# Patient Record
Sex: FEMALE | Race: WHITE | NOT HISPANIC OR LATINO | Employment: STUDENT | URBAN - METROPOLITAN AREA
[De-identification: names, ages, dates, MRNs, and addresses within clinical notes are randomized per-mention and may not be internally consistent; named-entity substitution may affect disease eponyms.]

---

## 2020-06-20 ENCOUNTER — OFFICE VISIT (OUTPATIENT)
Dept: URGENT CARE | Facility: PHYSICIAN GROUP | Age: 25
End: 2020-06-20
Payer: COMMERCIAL

## 2020-06-20 VITALS
HEIGHT: 62 IN | HEART RATE: 86 BPM | OXYGEN SATURATION: 97 % | TEMPERATURE: 98.6 F | BODY MASS INDEX: 21.16 KG/M2 | RESPIRATION RATE: 16 BRPM | DIASTOLIC BLOOD PRESSURE: 82 MMHG | WEIGHT: 115 LBS | SYSTOLIC BLOOD PRESSURE: 138 MMHG

## 2020-06-20 DIAGNOSIS — L42 PITYRIASIS ROSEA: ICD-10-CM

## 2020-06-20 PROCEDURE — 99202 OFFICE O/P NEW SF 15 MIN: CPT | Performed by: EMERGENCY MEDICINE

## 2020-06-20 RX ORDER — ACYCLOVIR 800 MG/1
800 TABLET ORAL
Qty: 35 TAB | Refills: 0 | Status: SHIPPED | OUTPATIENT
Start: 2020-06-20 | End: 2020-06-27

## 2020-06-20 ASSESSMENT — ENCOUNTER SYMPTOMS: FEVER: 0

## 2020-06-20 NOTE — PROGRESS NOTES
"Subjective:      Evonne Schreiber is a 24 y.o. female who presents with Rash (all over torso x 1 week)            Rash   This is a new problem. The current episode started in the past 7 days. The problem has been gradually worsening since onset. The affected locations include the torso, left axilla, left arm, right axilla, right arm, left upper leg and right upper leg. The rash is characterized by redness and itchiness. She was exposed to nothing. Pertinent negatives include no fever or joint pain. Treatments tried: antifungal. The treatment provided no relief. There is no history of eczema.       Review of Systems   Constitutional: Negative for fever.   Musculoskeletal: Negative for joint pain.   Skin: Positive for itching and rash.   Endo/Heme/Allergies: Negative for environmental allergies.       History reviewed. No pertinent past medical history.  History reviewed. No pertinent surgical history.   Allergy:  Patient has no known allergies.     Current Outpatient Medications:   •  acyclovir, 800 mg, Oral, 5X/DAY   family history is not on file.   Social History     Tobacco Use   • Smoking status: Current Every Day Smoker   • Smokeless tobacco: Never Used   Substance Use Topics   • Alcohol use: Not on file   • Drug use: Not on file       Objective:     /82 (BP Location: Right arm, Patient Position: Sitting, BP Cuff Size: Adult)   Pulse 86   Temp 37 °C (98.6 °F) (Temporal)   Resp 16   Ht 1.575 m (5' 2\")   Wt 52.2 kg (115 lb)   SpO2 97%   BMI 21.03 kg/m²      Physical Exam  Constitutional:       General: She is not in acute distress.     Appearance: Normal appearance. She is well-developed.   Eyes:      General: Lids are normal.      Conjunctiva/sclera: Conjunctivae normal.   Pulmonary:      Effort: Pulmonary effort is normal.   Skin:     Findings: Rash present. No petechiae or wound.          Neurological:      Mental Status: She is alert and oriented to person, place, and time.   Psychiatric:      " Behavior: Behavior is cooperative.                 Assessment/Plan:       1. Pityriasis rosea  OTC antihistamine as needed itching.  Rx acyclovir

## 2020-06-20 NOTE — PATIENT INSTRUCTIONS
Use over-the-counter levocetirizine (Xyxal), cetirizine (Zyrtec), fexofenadine (Allegra), or loratadine (Claritin) as needed for relief of symptoms.  Pityriasis Rosea  Introduction  Pityriasis rosea is a rash that usually appears on the trunk of the body. It may also appear on the upper arms and upper legs. It usually begins as a single patch, and then more patches begin to develop. The rash may cause mild itching, but it normally does not cause other problems. It usually goes away without treatment. However, it may take weeks or months for the rash to go away completely.  What are the causes?  The cause of this condition is not known. The condition does not spread from person to person (is noncontagious).  What increases the risk?  This condition is more likely to develop in young adults and children. It is most common in the spring and fall.  What are the signs or symptoms?  The main symptom of this condition is a rash.  · The rash usually begins with a single oval patch that is larger than the ones that follow. This is called a herald patch. It generally appears a week or more before the rest of the rash appears.  · When more patches start to develop, they spread quickly on the trunk, back, and arms. These patches are smaller than the first one.  · The patches that make up the rash are usually oval-shaped and pink or red in color. They are usually flat, but they may sometimes be raised so that they can be felt with a finger. They may also be finely crinkled and have a scaly ring around the edge.  · The rash does not typically appear on areas of the skin that are exposed to the sun.  Most people who have this condition do not have other symptoms, but some have mild itching. In a few cases, a mild headache or body aches may occur before the rash appears and then go away.  How is this diagnosed?  Your health care provider may diagnose this condition by doing a physical exam and taking your medical history. To rule  out other possible causes for the rash, the health care provider may order blood tests or take a skin sample from the rash to be looked at under a microscope.  How is this treated?  Usually, treatment is not needed for this condition. The rash will probably go away on its own in 4-8 weeks. In some cases, a health care provider may recommend or prescribe medicine to reduce itching.  Follow these instructions at home:  · Take medicines only as directed by your health care provider.  · Avoid scratching the affected areas of skin.  · Do not take hot baths or use a sauna. Use only warm water when bathing or showering. Heat can increase itching.  Contact a health care provider if:  · Your rash does not go away in 8 weeks.  · Your rash gets much worse.  · You have a fever.  · You have swelling or pain in the rash area.  · You have fluid, blood, or pus coming from the rash area.  This information is not intended to replace advice given to you by your health care provider. Make sure you discuss any questions you have with your health care provider.  Document Released: 01/24/2003 Document Revised: 05/25/2017 Document Reviewed: 11/25/2015  © 2017 [employer]

## 2021-07-14 ENCOUNTER — HOSPITAL ENCOUNTER (OUTPATIENT)
Facility: MEDICAL CENTER | Age: 26
End: 2021-07-14
Attending: OBSTETRICS & GYNECOLOGY | Admitting: OBSTETRICS & GYNECOLOGY
Payer: COMMERCIAL

## 2021-07-14 ENCOUNTER — APPOINTMENT (OUTPATIENT)
Dept: RADIOLOGY | Facility: MEDICAL CENTER | Age: 26
End: 2021-07-14
Attending: OBSTETRICS & GYNECOLOGY
Payer: COMMERCIAL

## 2021-07-14 VITALS — HEART RATE: 85 BPM | DIASTOLIC BLOOD PRESSURE: 85 MMHG | OXYGEN SATURATION: 97 % | SYSTOLIC BLOOD PRESSURE: 120 MMHG

## 2021-07-14 PROCEDURE — 93971 EXTREMITY STUDY: CPT | Mod: 26,RT | Performed by: INTERNAL MEDICINE

## 2021-07-14 PROCEDURE — 93971 EXTREMITY STUDY: CPT | Mod: RT

## 2021-07-14 NOTE — ED PROVIDER NOTES
OB ED Note    CC: Right chest tenderness, RLE pain    HPI: Evonne Box is a 26 yo  who is PPD#7 after IOL for preeclampsia with severe features. She had a prolonged induction that eventually culminated in vaginal delivery. She was on mag for her whole induction and 24 hours post partum. She reports persistent pain her RLE since her admission for delivery. It is worse with flexion of her foot and palpation of her calf. She denies swelling. It was never imaged at [facility]. She also reports sharp pain on her right breast/chest wall that is worse with inspiration. Denies SOB. She did wake up this morning and had swelling of her right eyelid and hand but this has since resolved.     ROS: all other systems were reviewed and found to be negative    /85   Pulse 85   SpO2 97%     General: NAD, sitting up comfortably, speaking full sentences\  Chest: TTP reproduced over her right medial breast at 2-o-clock, palpable clogged duct, milky discharge seen from her nipples  Abdomen: soft, NT, non distended  Extremities: symmetric in size, no visible erythema, TTP of her right calf    Doppler US: negative for DVT    Assessment  RLE pain  Clogged duct    Plan: No DVT, symptomatic management of her discomfort reviewed. Discussed management of a clogged duct - heat packs, massage and pumped recommended. Pt will call if no improvement in 48 hours.    Alek Kenney M.D.

## 2021-07-14 NOTE — DISCHARGE INSTRUCTIONS
PATIENT DISCHARGE EDUCATION INSTRUCTION SHEET  REASONS TO CALL YOUR OBSTETRICIAN  · Persistent fever, shaking, chills (Temperature higher than 100.4) may indicate you have an infection  · Heavy bleeding: soaking more than 1 pad per hour; Passing clots an egg-sized clot or bigger may mean you have an postpartum hemorrhage  · Foul odor from vagina or bad smelling or discolored discharge or blood  · Breast infection (Mastitis symptoms); breast pain, chills, fever, redness or red streaks, may feel flu like symptoms  · Urinary pain, burning or frequency  · Incision that is not healing, increased redness, swelling, tenderness or pain, or any pus from episiotomy or  site may mean you have an infection  · Redness, swelling, warmth, or painful to touch in the calf area of your leg may mean you have a blood clot  · Severe or intensified depression, thoughts or feelings of wanting to hurt yourself or someone else   · Pain in chest, obstructed breathing or shortness of breath (trouble catching your breath) may mean you are having a postpartum complication. Call your provider immediately   · Headache that does not get better, even after taking medicine, a bad headache with vision changes or pain in the upper right area of your belly may mean you have high blood pressure or post birth preeclampsia. Call your provider immediately    HAND WASHING  All family and friends should wash their hands:  · Before and after holding the baby  · Before feeding the baby  · After using the restroom or changing the baby's diaper    WOUND CARE  Ask your physician for additional care instructions. In general:  ·  Incision:  · May shower and pat incision dry   · Keep the incision clean and dry  · There should not be any opening or pus from the incision  · Continue to walk at home 3 times a day   · Do NOT lift anything heavier than your baby (over 10 pounds)  · Encourage family to help participate in care of the  to allow  rest and mom time to heal  · Episiotomy/Laceration  · May use lora-spray bottle, witch hazel pads and dermaplast spray for comfort  · Use lora-spray bottle after urinating to cleanse perineal area  · To prevent burning during urination spray lora-water bottle on labial area   · Pat perineal area dry until episiotomy/laceration is healed  · Continue to use lora-bottle until bleeding stops as needed  · If have a 2nd degree laceration or greater, a Sitz bath can offer relief from soreness, burning, and inflammation   · Sitz Bath   · Sit in 6 inches of warm water and soak laceration as needed until the laceration heals    VAGINAL CARE AND BLEEDING  · Nothing inside vagina for 6 weeks:   · No sexual intercourse, tampons or douching  · Bleeding may continue for 2-4 weeks. Amount and color may vary  · Soaking 1 pad or more in an hour for several hours is considered heavy bleeding  · Passing large egg sized blood clots can be concerning  · If you feel like you have heavy bleeding or are having increasing amount of blood clots call your Obstetrician immediately  · If you begin feeling faint upon standing, feeling sick to your stomach, have clammy skin, a really fast heartbeat, have chills, start feeling confused, dizzy, sleepy or weak, or feeling like you're going to faint call your Obstetrician immediately    HYPERTENSION   Preeclampsia or gestational hypertension are types of high blood pressure that only pregnant women can get. It is important for you to be aware of symptoms to seek early intervention and treatment. If you have any of these symptoms immediately call your Obstetrician    · Vision changes or blurred vision   · Severe headache or pain that is unrelieved with medication and will not go away  · Persistent pain in upper abdomen or shoulder   · Increased swelling of face, feet, or hands  · Difficulty breathing or shortness of breath at rest  · Urinating less than usual    URINATION AND BOWEL MOVEMENTS  · Eating  "more fiber (bran cereal, fruits, and vegetables) and drinking plenty of fluids will help to avoid constipation  · Urinary frequency and urgency after childbirth is normal  · If you experience any urinary pain, burning or frequency call your provider    BIRTH CONTROL  · It is possible to become pregnant at any time after delivery and while breastfeeding  · Plan to discuss a method of birth control with your physician at your post delivery follow up visit    POSTPARTUM BLUES  During the first few days after birth, you may experience a sense of the \"blues\" which may include impatience, irritability or even crying. These feelings come and go quickly. However, as many as 1 in 10 women experience emotional symptoms known as postpartum depression.     POSTPARTUM DEPRESSION    May start as early as the second or third day after delivery or take several weeks or months to develop. Symptoms of \"blues\" are present, but are more intense: Crying spells; loss of appetite; feelings of hopelessness or loss of control; fear of touching the baby; over concern or no concern at all about the baby; little or no concern about your own appearance/caring for yourself; and/or inability to sleep or excessive sleeping. Contact your Obstetrician if you are experiencing any of these symptoms     PREVENTING SHAKEN BABY  If you are angry or stressed, PUT THE BABY IN THE CRIB, step away, take some deep breaths, and wait until you are calm to care for the baby. DO NOT SHAKE THE BABY. You are not alone, call a supporter for help.  · [facility] 24/7 crisis call line (397-734-6620) or (1-300.968.1210)  · You can also text them, text \"ANSWER\" (092157)        "

## 2021-07-14 NOTE — PROGRESS NOTES
26 y/o , pt delivered on 21 at [facility]. Pt was an IOL for Pre -E, pt states she was on magnesium for two days before delivering. Pt came in today with complaints of right calf pain for the last 3-4 days. No redness or swelling noted. Pt rates pain of 4/10 with increased pain when touched, states constant pain. Last night pt also states she had swelling to her right eye and hand that went away after she placed warm packs on the swelling. None noted now. Pt also states last night she noticed pain in her right breast. Pt initial BP was 144/101. Call placed to Dr. Pablito MD updated on pt.   1415 - Dr. Kenney to bedside. Pt assessed by MD. Orders received for an US of the right calf to rule out DVT. Per MD pt likely has a clogged duct on the right breast. BP now 118/76.   1445 - Heat pack and pump kit provided. Pt began pumping.   1500 - US at bedside. US completed, awaiting results.   1545 - Results of US back. Results reviewed with Dr. Kenney. MD in to see pt. Orders to discharge pt. Pt feels safe to discharge. Pt understands when to return or call with any concerns.

## 2022-12-07 ENCOUNTER — GYNECOLOGY VISIT (OUTPATIENT)
Dept: OBGYN | Facility: CLINIC | Age: 27
End: 2022-12-07
Payer: COMMERCIAL

## 2022-12-07 VITALS
DIASTOLIC BLOOD PRESSURE: 62 MMHG | SYSTOLIC BLOOD PRESSURE: 122 MMHG | WEIGHT: 174 LBS | BODY MASS INDEX: 32.02 KG/M2 | HEIGHT: 62 IN

## 2022-12-07 DIAGNOSIS — O09.292 HX OF PREECLAMPSIA, PRIOR PREGNANCY, CURRENTLY PREGNANT, SECOND TRIMESTER: ICD-10-CM

## 2022-12-07 DIAGNOSIS — N93.8 DUB (DYSFUNCTIONAL UTERINE BLEEDING): ICD-10-CM

## 2022-12-07 PROCEDURE — 99203 OFFICE O/P NEW LOW 30 MIN: CPT | Mod: 25 | Performed by: PHYSICIAN ASSISTANT

## 2022-12-07 PROCEDURE — 76830 TRANSVAGINAL US NON-OB: CPT | Performed by: PHYSICIAN ASSISTANT

## 2022-12-07 RX ORDER — CHLORAL HYDRATE 500 MG
1000 CAPSULE ORAL
Status: ON HOLD | COMMUNITY
End: 2023-05-08

## 2022-12-07 RX ORDER — SODIUM PHOSPHATE,MONO-DIBASIC 19G-7G/118
500 ENEMA (ML) RECTAL
Status: ON HOLD | COMMUNITY
End: 2023-05-08

## 2022-12-07 NOTE — PROGRESS NOTES
"Subjective     Evonne Olvera is a 27 y.o. female who presents with Gynecologic Exam (DUB)  Pt is sure of LMP. Dating is by US c/w LMP.   OBHx: Hx term  after IOL at 38 wk for preeclampsia, denies GDM or delivery complications.   PMHx: Denies  SHX: T&D, also L knee surgery, denies any surgical or anesthetic complications.   Allergies: NKDA  Social: Denies tob, etoh or drug use   Meds; Taking PNV gummies - will start PO FeSo4 supplementation and ASA 81mg daily due to hx preeclampsia  Pt currently denies strong cramping, bleeding or pain, though pt has mild cramping but pt hasnt been drinking enough water. Possible small FM.             HPI    Review of Systems   All other systems reviewed and are negative.           Objective     /62   Ht 5' 2\"   Wt 174 lb   LMP 2022   BMI 31.83 kg/m²      Physical Exam  Vitals reviewed.   Constitutional:       Appearance: She is well-developed.   HENT:      Head: Normocephalic and atraumatic.   Eyes:      Pupils: Pupils are equal, round, and reactive to light.   Neck:      Thyroid: No thyromegaly.   Cardiovascular:      Rate and Rhythm: Normal rate and regular rhythm.      Heart sounds: Normal heart sounds.   Pulmonary:      Effort: Pulmonary effort is normal. No respiratory distress.      Breath sounds: Normal breath sounds.   Abdominal:      General: Bowel sounds are normal. There is no distension.      Palpations: Abdomen is soft.      Tenderness: There is no abdominal tenderness.   Genitourinary:     Exam position: Supine.      Uterus: Enlarged (Gravid, uterus c/w 17-18wk size). Not deviated and not tender.    Musculoskeletal:      Cervical back: Normal range of motion and neck supple.   Skin:     General: Skin is warm and dry.      Findings: No erythema.   Neurological:      Mental Status: She is alert.      Deep Tendon Reflexes: Reflexes are normal and symmetric.   Psychiatric:         Behavior: Behavior normal.         Thought Content: Thought content " normal.             BSUS single viable IUP seen with TYLER 5/11/23 based on measurements. +cardiac activity at 150bpm, +FM.     BPD: 5.82cm  HC: 15.11cm  FL 2.55cm    Unable to get AC due to fetal position.     Would recommend keeping TYLER based on LMP.               Assessment & Plan        1. DUB (dysfunctional uterine bleeding)  - NOB visit 1-2 wks    2. Hx of preeclampsia, prior pregnancy, currently pregnant, second trimester  - ASA 81mg daily recommended  - PIH baseline labs at NOB visit

## 2022-12-07 NOTE — PROGRESS NOTES
Patient here for GYN/DUB  LMP: 8/11/2022  TYLER: 5/18/2023  GA: 16w6d  Last pap: Pt states has never had one  Pt states having dizziness.

## 2022-12-21 ENCOUNTER — APPOINTMENT (OUTPATIENT)
Dept: OBGYN | Facility: CLINIC | Age: 27
End: 2022-12-21
Payer: MEDICAID

## 2022-12-21 ENCOUNTER — INITIAL PRENATAL (OUTPATIENT)
Dept: OBGYN | Facility: CLINIC | Age: 27
End: 2022-12-21
Payer: MEDICAID

## 2022-12-21 ENCOUNTER — HOSPITAL ENCOUNTER (OUTPATIENT)
Facility: MEDICAL CENTER | Age: 27
End: 2022-12-21
Attending: PHYSICIAN ASSISTANT
Payer: MEDICAID

## 2022-12-21 VITALS — BODY MASS INDEX: 28.06 KG/M2 | SYSTOLIC BLOOD PRESSURE: 122 MMHG | WEIGHT: 153.4 LBS | DIASTOLIC BLOOD PRESSURE: 68 MMHG

## 2022-12-21 DIAGNOSIS — Z34.82 ENCOUNTER FOR SUPERVISION OF NORMAL PREGNANCY IN MULTIGRAVIDA IN SECOND TRIMESTER: ICD-10-CM

## 2022-12-21 PROBLEM — N93.8 DUB (DYSFUNCTIONAL UTERINE BLEEDING): Status: RESOLVED | Noted: 2022-12-07 | Resolved: 2022-12-21

## 2022-12-21 PROCEDURE — 90686 IIV4 VACC NO PRSV 0.5 ML IM: CPT | Performed by: PHYSICIAN ASSISTANT

## 2022-12-21 PROCEDURE — 87624 HPV HI-RISK TYP POOLED RSLT: CPT

## 2022-12-21 PROCEDURE — 87491 CHLMYD TRACH DNA AMP PROBE: CPT

## 2022-12-21 PROCEDURE — 0500F INITIAL PRENATAL CARE VISIT: CPT | Performed by: PHYSICIAN ASSISTANT

## 2022-12-21 PROCEDURE — 87591 N.GONORRHOEAE DNA AMP PROB: CPT

## 2022-12-21 PROCEDURE — 88175 CYTOPATH C/V AUTO FLUID REDO: CPT

## 2022-12-21 PROCEDURE — 90471 IMMUNIZATION ADMIN: CPT | Performed by: PHYSICIAN ASSISTANT

## 2022-12-21 ASSESSMENT — EDINBURGH POSTNATAL DEPRESSION SCALE (EPDS)
I HAVE FELT SCARED OR PANICKY FOR NO GOOD REASON: YES, SOMETIMES
I HAVE BEEN ANXIOUS OR WORRIED FOR NO GOOD REASON: YES, SOMETIMES
I HAVE BEEN SO UNHAPPY THAT I HAVE HAD DIFFICULTY SLEEPING: YES, SOMETIMES
TOTAL SCORE: 15
THINGS HAVE BEEN GETTING ON TOP OF ME: NO, MOST OF THE TIME I HAVE COPED QUITE WELL
I HAVE LOOKED FORWARD WITH ENJOYMENT TO THINGS: DEFINITELY LESS THAN I USED TO
I HAVE FELT SAD OR MISERABLE: YES, QUITE OFTEN
I HAVE BEEN SO UNHAPPY THAT I HAVE BEEN CRYING: YES, QUITE OFTEN
I HAVE BEEN ABLE TO LAUGH AND SEE THE FUNNY SIDE OF THINGS: AS MUCH AS I ALWAYS COULD
THE THOUGHT OF HARMING MYSELF HAS OCCURRED TO ME: NEVER
I HAVE BLAMED MYSELF UNNECESSARILY WHEN THINGS WENT WRONG: YES, SOME OF THE TIME

## 2022-12-21 ASSESSMENT — ENCOUNTER SYMPTOMS
DEPRESSION: 0
VOMITING: 0
NAUSEA: 0

## 2022-12-21 NOTE — PROGRESS NOTES
Subjective     Evonne Olvera is a 27 y.o. female who presents with New ob visit. Pt is sure of LMP. Dating is by US c/w LMP at 16wks.   OBHx: Hx term  after IOL at 38 wk for preeclampsia, denies GDM or delivery complications.   PMHx: Denies  SHX: T&D, also L knee surgery, denies any surgical or anesthetic complications.   Allergies: NKDA  Social: Denies tob, etoh or drug use   Meds; Taking PNV gummies with FeSO4 supplements though they are making her nauseous, so pt to change to PNV tabs. Also, pt is taking ASA 81mg daily due to hx preeclampsia.  Pt currently denies strong cramping, bleeding or pain, though pt has mild cramping but pt hasnt been drinking enough water. Possible small FM only. EPDS was 15 - pt denies SI, HI, notes she is tearful most days, though has good emotional support.             HPI    Review of Systems   Gastrointestinal:  Negative for nausea and vomiting.   Psychiatric/Behavioral:  Negative for depression.    All other systems reviewed and are negative.           Objective     Wt 153 lb 6.4 oz   LMP 2022   BMI 28.06 kg/m²      Physical Exam  Vitals reviewed.   Constitutional:       Appearance: She is well-developed.   HENT:      Head: Normocephalic and atraumatic.   Eyes:      Pupils: Pupils are equal, round, and reactive to light.   Neck:      Thyroid: No thyromegaly.   Cardiovascular:      Rate and Rhythm: Normal rate and regular rhythm.      Heart sounds: Normal heart sounds.   Pulmonary:      Effort: Pulmonary effort is normal. No respiratory distress.      Breath sounds: Normal breath sounds.   Abdominal:      General: Bowel sounds are normal. There is no distension.      Palpations: Abdomen is soft.      Tenderness: There is no abdominal tenderness.   Genitourinary:     Exam position: Supine.      Labia:         Right: No rash or tenderness.         Left: No rash or tenderness.       Vagina: Normal. No signs of injury and foreign body. No vaginal discharge or erythema.       Cervix: No cervical motion tenderness.      Uterus: Enlarged (Gravid, uterus c/w 18-19 wk size). Not deviated and not tender.       Adnexa:         Right: No mass or tenderness.          Left: No mass or tenderness.     Musculoskeletal:      Cervical back: Normal range of motion and neck supple.   Skin:     General: Skin is warm and dry.      Findings: No erythema.   Neurological:      Mental Status: She is alert.      Deep Tendon Reflexes: Reflexes are normal and symmetric.   Psychiatric:         Behavior: Behavior normal.         Thought Content: Thought content normal.                           Assessment & Plan        1. Encounter for supervision of normal pregnancy in multigravida in second trimester  - F/u 4 wk, US 2 wk  - PREG CNTR PRENATAL PN; Future  - THINPREP PAP W/HPV AND CTNG; Future  - US-OB 2ND 3RD TRI COMPLETE; Future  - AFP TETRA; Future  - INFLUENZA VACCINE QUAD INJ (PF)

## 2022-12-21 NOTE — PROGRESS NOTES
Pt. Here for NOB visit.  Pt had a DUB on 12/7/2022  Pt. States having nausea and pelvic pain.   LMP 8/11/2022  Last pap smear pt states has never had one.   Flu vaccine given today   Pt given AFP lab slip today along with instructions.

## 2022-12-22 DIAGNOSIS — Z34.82 ENCOUNTER FOR SUPERVISION OF NORMAL PREGNANCY IN MULTIGRAVIDA IN SECOND TRIMESTER: ICD-10-CM

## 2022-12-22 LAB
C TRACH DNA GENITAL QL NAA+PROBE: NEGATIVE
CYTOLOGY REG CYTOL: NORMAL
HPV HR 12 DNA CVX QL NAA+PROBE: NEGATIVE
HPV16 DNA SPEC QL NAA+PROBE: NEGATIVE
HPV18 DNA SPEC QL NAA+PROBE: NEGATIVE
N GONORRHOEA DNA GENITAL QL NAA+PROBE: NEGATIVE
SPECIMEN SOURCE: NORMAL
SPECIMEN SOURCE: NORMAL

## 2023-01-05 ENCOUNTER — HOSPITAL ENCOUNTER (OUTPATIENT)
Dept: LAB | Facility: MEDICAL CENTER | Age: 28
End: 2023-01-05
Attending: PHYSICIAN ASSISTANT
Payer: MEDICAID

## 2023-01-05 DIAGNOSIS — Z34.82 ENCOUNTER FOR SUPERVISION OF NORMAL PREGNANCY IN MULTIGRAVIDA IN SECOND TRIMESTER: ICD-10-CM

## 2023-01-05 LAB
ABO GROUP BLD: NORMAL
BLD GP AB SCN SERPL QL: NORMAL
ERYTHROCYTE [DISTWIDTH] IN BLOOD BY AUTOMATED COUNT: 51.4 FL (ref 35.9–50)
HBV SURFACE AG SER QL: ABNORMAL
HCT VFR BLD AUTO: 41.7 % (ref 37–47)
HCV AB SER QL: ABNORMAL
HGB BLD-MCNC: 14.1 G/DL (ref 12–16)
HIV 1+2 AB+HIV1 P24 AG SERPL QL IA: NORMAL
MCH RBC QN AUTO: 33.3 PG (ref 27–33)
MCHC RBC AUTO-ENTMCNC: 33.8 G/DL (ref 33.6–35)
MCV RBC AUTO: 98.6 FL (ref 81.4–97.8)
PLATELET # BLD AUTO: 279 K/UL (ref 164–446)
PMV BLD AUTO: 10 FL (ref 9–12.9)
RBC # BLD AUTO: 4.23 M/UL (ref 4.2–5.4)
RH BLD: NORMAL
RUBV AB SER QL: 53.9 IU/ML
T PALLIDUM AB SER QL IA: ABNORMAL
WBC # BLD AUTO: 10.7 K/UL (ref 4.8–10.8)

## 2023-01-05 PROCEDURE — 86803 HEPATITIS C AB TEST: CPT

## 2023-01-05 PROCEDURE — 86850 RBC ANTIBODY SCREEN: CPT

## 2023-01-05 PROCEDURE — 86780 TREPONEMA PALLIDUM: CPT

## 2023-01-05 PROCEDURE — 81511 FTL CGEN ABNOR FOUR ANAL: CPT

## 2023-01-05 PROCEDURE — 36415 COLL VENOUS BLD VENIPUNCTURE: CPT

## 2023-01-05 PROCEDURE — 87389 HIV-1 AG W/HIV-1&-2 AB AG IA: CPT

## 2023-01-05 PROCEDURE — 86901 BLOOD TYPING SEROLOGIC RH(D): CPT

## 2023-01-05 PROCEDURE — 87340 HEPATITIS B SURFACE AG IA: CPT

## 2023-01-05 PROCEDURE — 87086 URINE CULTURE/COLONY COUNT: CPT

## 2023-01-05 PROCEDURE — 85027 COMPLETE CBC AUTOMATED: CPT

## 2023-01-05 PROCEDURE — 86762 RUBELLA ANTIBODY: CPT

## 2023-01-05 PROCEDURE — 86900 BLOOD TYPING SEROLOGIC ABO: CPT

## 2023-01-07 LAB
BACTERIA UR CULT: NORMAL
SIGNIFICANT IND 70042: NORMAL
SITE SITE: NORMAL
SOURCE SOURCE: NORMAL

## 2023-01-08 LAB
# FETUSES US: NORMAL
AFP MOM SERPL: 1.19
AFP SERPL-MCNC: 82 NG/ML
AGE - REPORTED: 26.8 YR
CURRENT SMOKER: NO
FAMILY MEMBER DISEASES HX: NO
GA METHOD: NORMAL
GA: NORMAL WK
HCG MOM SERPL: 1.8
HCG SERPL-ACNC: NORMAL IU/L
HX OF HEREDITARY DISORDERS: NO
IDDM PATIENT QL: NO
INHIBIN A MOM SERPL: 2.1
INHIBIN A SERPL-MCNC: 382 PG/ML
INTEGRATED SCN PATIENT-IMP: NORMAL
PATHOLOGY STUDY: NORMAL
SPECIMEN DRAWN SERPL: NORMAL
U ESTRIOL MOM SERPL: 1.39
U ESTRIOL SERPL-MCNC: 3.89 NG/ML

## 2023-01-12 ENCOUNTER — APPOINTMENT (OUTPATIENT)
Dept: RADIOLOGY | Facility: IMAGING CENTER | Age: 28
End: 2023-01-12
Attending: PHYSICIAN ASSISTANT
Payer: COMMERCIAL

## 2023-01-12 DIAGNOSIS — Z34.82 ENCOUNTER FOR SUPERVISION OF NORMAL PREGNANCY IN MULTIGRAVIDA IN SECOND TRIMESTER: ICD-10-CM

## 2023-01-12 PROCEDURE — 76805 OB US >/= 14 WKS SNGL FETUS: CPT | Mod: TC | Performed by: PHYSICIAN ASSISTANT

## 2023-01-19 ENCOUNTER — ROUTINE PRENATAL (OUTPATIENT)
Dept: OBGYN | Facility: CLINIC | Age: 28
End: 2023-01-19
Payer: MEDICAID

## 2023-01-19 VITALS — WEIGHT: 160.6 LBS | DIASTOLIC BLOOD PRESSURE: 48 MMHG | BODY MASS INDEX: 29.37 KG/M2 | SYSTOLIC BLOOD PRESSURE: 110 MMHG

## 2023-01-19 DIAGNOSIS — F41.9 ANXIETY: ICD-10-CM

## 2023-01-19 DIAGNOSIS — Z34.82 ENCOUNTER FOR SUPERVISION OF NORMAL PREGNANCY IN MULTIGRAVIDA IN SECOND TRIMESTER: ICD-10-CM

## 2023-01-19 PROCEDURE — 0502F SUBSEQUENT PRENATAL CARE: CPT | Performed by: PHYSICIAN ASSISTANT

## 2023-01-19 RX ORDER — HYDROXYZINE PAMOATE 25 MG/1
25 CAPSULE ORAL 3 TIMES DAILY PRN
Qty: 90 CAPSULE | Refills: 1 | Status: SHIPPED | OUTPATIENT
Start: 2023-01-19

## 2023-01-19 NOTE — PROGRESS NOTES
Pt. Here for OB/FU. Reports Good FM.   Pt. Denies VB, LOF, or UC's.   Pt states no concerns or issues today.

## 2023-01-19 NOTE — PROGRESS NOTES
"Pt has no complaints with cramping, bleeding or pain, though notes she has felt more anxiety over past 3 weeks, with moment where she had her partner bring her to the office because she was overwhelmed and needed to be away from her 18 month old. Pt denies SI, HI, though states she \"just wants to get away\" sometimes. Pt has hx of anxiety and depression, as she lost her mother to suicide when pt was 12 yo, then her father at 20 yo. Pt was put on Fluoxitine, Seroquel and \"another med\" at 18yo but pt self d/c'd them at 18-21 yo. Pt has seen therapist in past, states it was the same one that saw her parents, but she didn't feels she got much benefit, so she stopped.     Pt states she is \"OCD\" which is difficult with a child. Her partner and her have been together since high school and feels he is very understanding and supportive, but he works nights, so she is alone at home with her 18 month old daughter, Analia. They sleep together, so her sleep is interrupted by her child, but she can sleep hard when she does sleep. She also has strong hip pain that is preventing her sleep as well, pt using pillows but with little relief.     Pt declines anti-depression medication at this time, as she felt it didn't help her in past but she was on 3 meds at once. She is also concerned about the risks to the baby. Pt will try therapy, writing in a journal and Vistaril for now, though pt urged to call if worsening feelings or SI. Pt has insurance changing, so will have pt f/u with Duc Khan and/or Della Chacon at 901 office. Pt urged to call if she needs something sooner.     PNL, AFP wnl, PAP NILM HPV neg, and US wnl though EIF seen - AFP wnl so no fu needed. 1hr GTT, CBC, T pallidium lab slip given today with instructions and instructed to do after 26 wks. RTC 4 wk or sooner prn.   "

## 2023-02-10 ENCOUNTER — ROUTINE PRENATAL (OUTPATIENT)
Dept: OBGYN | Facility: CLINIC | Age: 28
End: 2023-02-10
Payer: COMMERCIAL

## 2023-02-10 VITALS — WEIGHT: 156 LBS | DIASTOLIC BLOOD PRESSURE: 77 MMHG | SYSTOLIC BLOOD PRESSURE: 117 MMHG | BODY MASS INDEX: 28.53 KG/M2

## 2023-02-10 DIAGNOSIS — Z34.82 ENCOUNTER FOR SUPERVISION OF NORMAL PREGNANCY IN MULTIGRAVIDA IN SECOND TRIMESTER: ICD-10-CM

## 2023-02-10 DIAGNOSIS — F41.9 ANXIETY: ICD-10-CM

## 2023-02-10 DIAGNOSIS — O09.292 HX OF PREECLAMPSIA, PRIOR PREGNANCY, CURRENTLY PREGNANT, SECOND TRIMESTER: ICD-10-CM

## 2023-02-10 PROCEDURE — 0502F SUBSEQUENT PRENATAL CARE: CPT | Performed by: PHYSICIAN ASSISTANT

## 2023-02-10 NOTE — PROGRESS NOTES
Pt here today for OB follow up  Pt states no complaints   Reports +  Good # [phone]  Pharmacy Confirmed.  Chaperone offered and not indicated.

## 2023-02-10 NOTE — PROGRESS NOTES
S: 27 y.o.  at 26w1d presents for routine obstetric follow-up.   Good fetal movement.  No contractions, vaginal bleeding, or leakage of fluid.    Questions answered.  Pt having anxiety- using hydroxyzine 1-2x weekly with minimal relief. Reports usually sx occur suddenly and resolve by the time meds start working. No drowsiness from med. Hx of mild PPD with prior pregnancy. Declines daily med. Has previously been on fluoxetine and quetiapine. Not seeing therapy due to transportation issues but willing to do virtual. See prior note by Ralph Wong PA-C for more details.     O: LMP 2022   Patients' weight gain, fluid intake and exercise level discussed.  Vitals, fundal height , fetal position, and FHR reviewed on flowsheet    Recent US: 23 KADEN 14.85, cerv length 3.88, EFW 91.6%. Nonspecific echogenic focus in the left ventricle of the fetal heart. AFP WNL (no f/u needed).  Rh: pos  Labs: mid-tri labs ordered, appt scheduled next week    A/P:  27 y.o.  at 26w1d presents for routine obstetric follow-up.  Size equals dates and/or scan        1. Hx of preeclampsia, prior pregnancy, currently pregnant, second trimester    -BP stable    2. Encounter for supervision of normal pregnancy in multigravida in second trimester    - Continue prenatal vitamins.  - Fetal kick counts.  - Exercise at least 30 minutes daily. Drink at least 3-4L of water daily  - Labor precautions educated.    3. Anxiety - therapy and Vistartil started 23    - Continue hydroxyzine prn   - Referral to therapy   - Practiced deep breathing exercises       Follow-up in 2 weeks.    Della Chacon P.A.-C.

## 2023-02-22 ENCOUNTER — HOSPITAL ENCOUNTER (OUTPATIENT)
Dept: LAB | Facility: MEDICAL CENTER | Age: 28
End: 2023-02-22
Attending: PHYSICIAN ASSISTANT
Payer: COMMERCIAL

## 2023-02-22 DIAGNOSIS — Z34.82 ENCOUNTER FOR SUPERVISION OF NORMAL PREGNANCY IN MULTIGRAVIDA IN SECOND TRIMESTER: ICD-10-CM

## 2023-02-22 LAB
ERYTHROCYTE [DISTWIDTH] IN BLOOD BY AUTOMATED COUNT: 50.8 FL (ref 35.9–50)
GLUCOSE 1H P 50 G GLC PO SERPL-MCNC: 132 MG/DL (ref 70–139)
HCT VFR BLD AUTO: 41.2 % (ref 37–47)
HGB BLD-MCNC: 13.8 G/DL (ref 12–16)
MCH RBC QN AUTO: 33.1 PG (ref 27–33)
MCHC RBC AUTO-ENTMCNC: 33.5 G/DL (ref 33.6–35)
MCV RBC AUTO: 98.8 FL (ref 81.4–97.8)
PLATELET # BLD AUTO: 254 K/UL (ref 164–446)
PMV BLD AUTO: 9.6 FL (ref 9–12.9)
RBC # BLD AUTO: 4.17 M/UL (ref 4.2–5.4)
WBC # BLD AUTO: 13.2 K/UL (ref 4.8–10.8)

## 2023-02-22 PROCEDURE — 86780 TREPONEMA PALLIDUM: CPT

## 2023-02-22 PROCEDURE — 36415 COLL VENOUS BLD VENIPUNCTURE: CPT

## 2023-02-22 PROCEDURE — 82950 GLUCOSE TEST: CPT

## 2023-02-22 PROCEDURE — 85027 COMPLETE CBC AUTOMATED: CPT

## 2023-02-23 LAB — T PALLIDUM AB SER QL IA: NORMAL

## 2023-02-24 ENCOUNTER — ROUTINE PRENATAL (OUTPATIENT)
Dept: OBGYN | Facility: CLINIC | Age: 28
End: 2023-02-24
Payer: COMMERCIAL

## 2023-02-24 VITALS — BODY MASS INDEX: 31.09 KG/M2 | SYSTOLIC BLOOD PRESSURE: 120 MMHG | WEIGHT: 170 LBS | DIASTOLIC BLOOD PRESSURE: 82 MMHG

## 2023-02-24 DIAGNOSIS — O09.293 HX OF PREECLAMPSIA, PRIOR PREGNANCY, CURRENTLY PREGNANT, THIRD TRIMESTER: ICD-10-CM

## 2023-02-24 DIAGNOSIS — F41.9 ANXIETY: ICD-10-CM

## 2023-02-24 DIAGNOSIS — Z34.83 ENCOUNTER FOR SUPERVISION OF NORMAL PREGNANCY IN MULTIGRAVIDA IN THIRD TRIMESTER: ICD-10-CM

## 2023-02-24 DIAGNOSIS — Z3A.28 28 WEEKS GESTATION OF PREGNANCY: ICD-10-CM

## 2023-02-24 PROCEDURE — 0502F SUBSEQUENT PRENATAL CARE: CPT | Performed by: STUDENT IN AN ORGANIZED HEALTH CARE EDUCATION/TRAINING PROGRAM

## 2023-02-24 NOTE — LETTER
2023      Evonne Olvera is currently pregnant and being cared for by [facility].     She is medically cleared for:    Dental exams and routine cleaning  Tooth fillings and extractions as needed  Antibiotic therapy as appropriate  Local anesthesia    Patient may be administered the followin% Lidocaine with 1:100,000 Epinephrine  4% Septocaine with 1:100,000 Epinephrine  Nitrous Oxide  A narcotic or non-narcotic pain medication  An antibiotic such as penicillin or clindamycin    NO TETRACYCLINE and NO CODEINE        Thank you,          Jensen Lambert D.O.    Electronically Signed

## 2023-02-25 NOTE — PROGRESS NOTES
OB Visit Note - 28w1d     MEDICAL DECISION MAKING:  Evonne Olvera is a 27 y.o. female  at 28w1d     S: Pt presents for routine OB follow up. Good fetal movement. No contractions, vaginal bleeding, or leakage of fluid.   Is taking Vistaril as needed for anxiety, working well in addition to therapy. Questions answered. Is going to dentist soon and requests letter today.     O:  Vitals:    23 1553   BP: 120/82     Patients' weight gain, fluid intake and exercise level discussed.    FH: 29 cm  FHT: 146 bpm     See flow sheet.        Recent Labs     23  1303   ABOGROUP O   RUBELLAIGG 53.90   HEPBSAG Non-Reactive   HEPCAB Non-Reactive          A/P:Evonne Olvera is a 27 y.o. female  at 28w1d who presents for routine obstetric follow-up.    P:  Continue FKCs.    Continue Vistaril as needed for anxiety  Third trimester labs reviewed, WNL  Total weight gain: 17 pounds  Fetus with echogenic focus in left ventricle and EFW 91%ile at anatomy scan; recommend repeat growth scan at 32-36 weeks  Labor and return precautions given.  Instructions given on where to go - patient verbalized understanding.  All questions answered. Anticipatory guidance.  Encouraged adequate water intake.  PP contraception: undecided  Tdap at next visit; flu vaccine previously received  GBS at 36 weeks  F/u in 2 week(s) and PRN      Pregnancy complicated by:  Patient Active Problem List   Diagnosis    Hx of preeclampsia, prior pregnancy, currently pregnant, second trimester    Encounter for supervision of normal pregnancy in multigravida in second trimester    Anxiety - therapy and Vistartil started 23       The patient will follow up in 2 week(s). She was counseled to call and/or report to L&D for vaginal bleeding, regular contractions, leakage of fluid or decreased fetal movement.    Jensen Lambert D.O.

## 2023-03-06 ENCOUNTER — ROUTINE PRENATAL (OUTPATIENT)
Dept: OBGYN | Facility: CLINIC | Age: 28
End: 2023-03-06
Payer: COMMERCIAL

## 2023-03-06 VITALS — SYSTOLIC BLOOD PRESSURE: 119 MMHG | DIASTOLIC BLOOD PRESSURE: 68 MMHG | BODY MASS INDEX: 31.64 KG/M2 | WEIGHT: 173 LBS

## 2023-03-06 DIAGNOSIS — Z3A.29 29 WEEKS GESTATION OF PREGNANCY: ICD-10-CM

## 2023-03-06 DIAGNOSIS — Z34.83 ENCOUNTER FOR SUPERVISION OF NORMAL PREGNANCY IN MULTIGRAVIDA IN THIRD TRIMESTER: ICD-10-CM

## 2023-03-06 DIAGNOSIS — O09.293 HX OF PREECLAMPSIA, PRIOR PREGNANCY, CURRENTLY PREGNANT, THIRD TRIMESTER: ICD-10-CM

## 2023-03-06 PROCEDURE — 90471 IMMUNIZATION ADMIN: CPT | Performed by: STUDENT IN AN ORGANIZED HEALTH CARE EDUCATION/TRAINING PROGRAM

## 2023-03-06 PROCEDURE — 90715 TDAP VACCINE 7 YRS/> IM: CPT | Performed by: STUDENT IN AN ORGANIZED HEALTH CARE EDUCATION/TRAINING PROGRAM

## 2023-03-06 PROCEDURE — 0502F SUBSEQUENT PRENATAL CARE: CPT | Performed by: STUDENT IN AN ORGANIZED HEALTH CARE EDUCATION/TRAINING PROGRAM

## 2023-03-06 NOTE — PROGRESS NOTES
OB Visit Note - 29w4d     MEDICAL DECISION MAKING:  Evonne Olvera is a 27 y.o. female  at 29w4d     S: Pt presents for routine OB follow up. Good fetal movement. No contractions, vaginal bleeding, or leakage of fluid.  Questions answered. Desires Tdap today.     O:  Vitals:    23 1136   BP: 119/68     Patients' weight gain, fluid intake and exercise level discussed.    FH: 31 cm  FHT: 140 bpm       See flow sheet.        Recent Labs     23  1303   ABOGROUP O   RUBELLAIGG 53.90   HEPBSAG Non-Reactive   HEPCAB Non-Reactive          A/P:Evonne Olvera is a 27 y.o. female  at 29w4d who presents for routine obstetric follow-up.    P:  Continue FKCs.    Labor and return precautions given.  Instructions given on where to go - patient verbalized understanding.  All questions answered. Anticipatory guidance.  Encouraged adequate water intake.  PP contraception: undecided  Growth US ordered today for 32-34 weeks for left echogenic focus and h/o pre-e in prior pregnancy  GBS at 36 weeks  Tdap received today  F/u in 2 week(s) and PRN     Pregnancy complicated by:  Patient Active Problem List   Diagnosis    Hx of preeclampsia, prior pregnancy, currently pregnant, second trimester    Encounter for supervision of normal pregnancy in multigravida in second trimester    Anxiety - therapy and Vistartil started 23       The patient will follow up in 2 week(s). She was counseled to call and/or report to L&D for vaginal bleeding, regular contractions, leakage of fluid or decreased fetal movement.    Jensen Lambert D.O.

## 2023-03-24 ENCOUNTER — ROUTINE PRENATAL (OUTPATIENT)
Dept: OBGYN | Facility: CLINIC | Age: 28
End: 2023-03-24
Payer: COMMERCIAL

## 2023-03-24 VITALS — SYSTOLIC BLOOD PRESSURE: 117 MMHG | DIASTOLIC BLOOD PRESSURE: 68 MMHG | BODY MASS INDEX: 32.74 KG/M2 | WEIGHT: 179 LBS

## 2023-03-24 DIAGNOSIS — Z3A.32 32 WEEKS GESTATION OF PREGNANCY: ICD-10-CM

## 2023-03-24 DIAGNOSIS — Z34.83 ENCOUNTER FOR SUPERVISION OF NORMAL PREGNANCY IN MULTIGRAVIDA IN THIRD TRIMESTER: ICD-10-CM

## 2023-03-24 DIAGNOSIS — F41.9 ANXIETY: ICD-10-CM

## 2023-03-24 DIAGNOSIS — O09.293 HX OF PREECLAMPSIA, PRIOR PREGNANCY, CURRENTLY PREGNANT, THIRD TRIMESTER: ICD-10-CM

## 2023-03-24 PROCEDURE — 0502F SUBSEQUENT PRENATAL CARE: CPT | Performed by: STUDENT IN AN ORGANIZED HEALTH CARE EDUCATION/TRAINING PROGRAM

## 2023-03-24 NOTE — PROGRESS NOTES
OB Visit Note - 32w1d     MEDICAL DECISION MAKING:  Evonne Olvera is a 27 y.o. female  at 32w1d     S: Pt presents for routine OB follow up. Good fetal movement. No contractions, vaginal bleeding, or leakage of fluid.  Questions answered. Her growth ultrasound was rescheduled for .     O:  Vitals:    23 1137   BP: 117/68     Patients' weight gain, fluid intake and exercise level discussed.    FH: 33 cm  FHT: 142 bpm       See flow sheet.        Recent Labs     23  1303   ABOGROUP O   RUBELLAIGG 53.90   HEPBSAG Non-Reactive   HEPCAB Non-Reactive          A/P:Evonne Olvera is a 27 y.o. female  at 32w1d who presents for routine obstetric follow-up.    P:  Continue FKCs.    Labor and return precautions given.  Instructions given on where to go - patient verbalized understanding.  All questions answered. Anticipatory guidance.  Encouraged adequate water intake.  PP contraception: undecided  Growth US   GBS at 36 weeks  F/u  in 2 week(s) and PRN      Pregnancy complicated by:  Patient Active Problem List   Diagnosis    Hx of preeclampsia, prior pregnancy, currently pregnant, second trimester    Encounter for supervision of normal pregnancy in multigravida in second trimester    Anxiety - therapy and Vistartil started 23       The patient will follow up in 2 week(s). She was counseled to call and/or report to L&D for vaginal bleeding, regular contractions, leakage of fluid or decreased fetal movement.    Jensen Lambert D.O.

## 2023-04-04 ENCOUNTER — HOSPITAL ENCOUNTER (OUTPATIENT)
Dept: RADIOLOGY | Facility: MEDICAL CENTER | Age: 28
End: 2023-04-04
Attending: STUDENT IN AN ORGANIZED HEALTH CARE EDUCATION/TRAINING PROGRAM
Payer: COMMERCIAL

## 2023-04-04 DIAGNOSIS — O09.293 HX OF PREECLAMPSIA, PRIOR PREGNANCY, CURRENTLY PREGNANT, THIRD TRIMESTER: ICD-10-CM

## 2023-04-04 DIAGNOSIS — Z34.83 ENCOUNTER FOR SUPERVISION OF NORMAL PREGNANCY IN MULTIGRAVIDA IN THIRD TRIMESTER: ICD-10-CM

## 2023-04-04 PROCEDURE — 76816 OB US FOLLOW-UP PER FETUS: CPT

## 2023-04-13 ENCOUNTER — ROUTINE PRENATAL (OUTPATIENT)
Dept: OBGYN | Facility: CLINIC | Age: 28
End: 2023-04-13
Payer: COMMERCIAL

## 2023-04-13 VITALS — SYSTOLIC BLOOD PRESSURE: 120 MMHG | DIASTOLIC BLOOD PRESSURE: 79 MMHG | WEIGHT: 178 LBS | BODY MASS INDEX: 32.56 KG/M2

## 2023-04-13 DIAGNOSIS — Z34.83 ENCOUNTER FOR SUPERVISION OF NORMAL PREGNANCY IN MULTIGRAVIDA IN THIRD TRIMESTER: ICD-10-CM

## 2023-04-13 DIAGNOSIS — F41.9 ANXIETY: ICD-10-CM

## 2023-04-13 DIAGNOSIS — O09.293 HX OF PREECLAMPSIA, PRIOR PREGNANCY, CURRENTLY PREGNANT, THIRD TRIMESTER: ICD-10-CM

## 2023-04-13 DIAGNOSIS — Z3A.35 35 WEEKS GESTATION OF PREGNANCY: ICD-10-CM

## 2023-04-13 PROCEDURE — 0502F SUBSEQUENT PRENATAL CARE: CPT | Performed by: STUDENT IN AN ORGANIZED HEALTH CARE EDUCATION/TRAINING PROGRAM

## 2023-04-13 RX ORDER — HYDROXYZINE HYDROCHLORIDE 25 MG/1
25 TABLET, FILM COATED ORAL 3 TIMES DAILY PRN
Qty: 30 TABLET | Refills: 4 | Status: ON HOLD | OUTPATIENT
Start: 2023-04-13 | End: 2023-05-08

## 2023-04-13 NOTE — PROGRESS NOTES
OB Visit Note - 35w0d     MEDICAL DECISION MAKING:  Evonne Olvera is a 27 y.o. female  at 35w0d     S: Pt presents for routine OB follow up. Good fetal movement. No contractions, vaginal bleeding, or leakage of fluid.   Needs refill on hydroxyzine. Questions answered.     O:  Vitals:    23 1531   BP: 120/79     Patients' weight gain, fluid intake and exercise level discussed.    FH: 37 cm  FHT: 134 bpm     See flow sheet.        Recent Labs     23  1303   ABOGROUP O   RUBELLAIGG 53.90   HEPBSAG Non-Reactive   HEPCAB Non-Reactive          A/P:Evonne Olvera is a 27 y.o. female  at 35w0d who presents for routine obstetric follow-up.    P:  Continue FKCs.    Labor and return precautions given.  Instructions given on where to go - patient verbalized understanding.  All questions answered. Anticipatory guidance.  Encouraged adequate water intake.  PP contraception: undecided  GBS next visit  Confirm vertex with US next visit  Reviewed growth US findings  Refill provided on hydroxyzine  F/u in 1 week(s) and PRN     Pregnancy complicated by:  Patient Active Problem List   Diagnosis    Hx of preeclampsia, prior pregnancy, currently pregnant, second trimester    Encounter for supervision of normal pregnancy in multigravida in second trimester    Anxiety - therapy and Vistartil started 23       The patient will follow up in 1 week(s). She was counseled to call and/or report to L&D for vaginal bleeding, regular contractions, leakage of fluid or decreased fetal movement.    Jensen Lambert D.O.

## 2023-04-13 NOTE — PROGRESS NOTES
Pt here today for OB follow up  Pt states no concerns.  Reports +FM Good/Strong  Good #[phone] (home) [phone] (work)  Pharmacy Confirmed.  Chaperone offered and declined.

## 2023-04-18 ENCOUNTER — ROUTINE PRENATAL (OUTPATIENT)
Dept: OBGYN | Facility: CLINIC | Age: 28
End: 2023-04-18
Payer: COMMERCIAL

## 2023-04-18 VITALS — BODY MASS INDEX: 33.47 KG/M2 | WEIGHT: 183 LBS | DIASTOLIC BLOOD PRESSURE: 69 MMHG | SYSTOLIC BLOOD PRESSURE: 119 MMHG

## 2023-04-18 DIAGNOSIS — Z34.90 PREGNANCY, UNSPECIFIED GESTATIONAL AGE: ICD-10-CM

## 2023-04-18 DIAGNOSIS — O22.43 HEMORRHOIDS DURING PREGNANCY IN THIRD TRIMESTER: ICD-10-CM

## 2023-04-18 PROCEDURE — 0502F SUBSEQUENT PRENATAL CARE: CPT | Performed by: OBSTETRICS & GYNECOLOGY

## 2023-04-18 RX ORDER — HYDROCORTISONE ACETATE 25 MG/1
25 SUPPOSITORY RECTAL EVERY 12 HOURS
Qty: 12 SUPPOSITORY | Refills: 2 | Status: SHIPPED | OUTPATIENT
Start: 2023-04-18

## 2023-04-18 NOTE — PROGRESS NOTES
OB Visit Note - 35w0d     MEDICAL DECISION MAKING:  Evonne Olvera is a 27 y.o. female  at 35w5d     S: Pt presents for routine OB follow up. Good fetal movement. No contractions, vaginal bleeding, or leakage of fluid. She is complaining of hemorrhoid. Anusol was sent to the pharmacy.      O:  Vitals:    23 1134   BP: 119/69     Patients' weight gain, fluid intake and exercise level discussed.    FH: 36 cm  FHT: 141 bpm     See flow sheet.        Recent Labs     23  1303   ABOGROUP O   RUBELLAIGG 53.90   HEPBSAG Non-Reactive   HEPCAB Non-Reactive     A/P:Evonne Olvera is a 27 y.o. female  at 35w5d who presents for routine obstetric follow-up.    P:  Continue FKCs.    Labor and return precautions given.  Instructions given on where to go - patient verbalized understanding.  All questions answered. Anticipatory guidance.  Encouraged adequate water intake.  PP contraception: undecided  GBS next visit  Confirm vertex with US next visit  F/u in 1 week(s) and PRN     Pregnancy complicated by:  Patient Active Problem List   Diagnosis    Hx of preeclampsia, prior pregnancy, currently pregnant, second trimester    Encounter for supervision of normal pregnancy in multigravida in second trimester    Anxiety - therapy and Vistartil started 23       The patient will follow up in 1 week(s). She was counseled to call and/or report to L&D for vaginal bleeding, regular contractions, leakage of fluid or decreased fetal movement.    Heidi Walters M.D.

## 2023-04-26 ENCOUNTER — ROUTINE PRENATAL (OUTPATIENT)
Dept: OBGYN | Facility: CLINIC | Age: 28
End: 2023-04-26
Payer: COMMERCIAL

## 2023-04-26 ENCOUNTER — HOSPITAL ENCOUNTER (OUTPATIENT)
Facility: MEDICAL CENTER | Age: 28
End: 2023-04-26
Attending: STUDENT IN AN ORGANIZED HEALTH CARE EDUCATION/TRAINING PROGRAM
Payer: COMMERCIAL

## 2023-04-26 VITALS — WEIGHT: 184 LBS | DIASTOLIC BLOOD PRESSURE: 84 MMHG | SYSTOLIC BLOOD PRESSURE: 131 MMHG | BODY MASS INDEX: 33.65 KG/M2

## 2023-04-26 DIAGNOSIS — F41.9 ANXIETY: ICD-10-CM

## 2023-04-26 DIAGNOSIS — Z34.83 ENCOUNTER FOR SUPERVISION OF NORMAL PREGNANCY IN MULTIGRAVIDA IN THIRD TRIMESTER: ICD-10-CM

## 2023-04-26 DIAGNOSIS — O09.293 HX OF PREECLAMPSIA, PRIOR PREGNANCY, CURRENTLY PREGNANT, THIRD TRIMESTER: ICD-10-CM

## 2023-04-26 DIAGNOSIS — Z3A.36 36 WEEKS GESTATION OF PREGNANCY: ICD-10-CM

## 2023-04-26 PROCEDURE — 87081 CULTURE SCREEN ONLY: CPT

## 2023-04-26 PROCEDURE — 0502F SUBSEQUENT PRENATAL CARE: CPT | Performed by: STUDENT IN AN ORGANIZED HEALTH CARE EDUCATION/TRAINING PROGRAM

## 2023-04-26 PROCEDURE — 87150 DNA/RNA AMPLIFIED PROBE: CPT

## 2023-04-26 NOTE — PROGRESS NOTES
OB Visit Note - 36w6d     MEDICAL DECISION MAKING:  Evonne Olvera is a 27 y.o. female  at 36w6d     S: Pt presents for routine OB follow up. Good fetal movement. No contractions, vaginal bleeding, or leakage of fluid.  Questions answered.     O:  Vitals:    23 0928   BP: 131/84     Patients' weight gain, fluid intake and exercise level discussed.    FH: 37 cm  FHT: 144 bpm  Presentation: vertex via limited ultrasound     See flow sheet.        Recent Labs     23  1303   ABOGROUP O   RUBELLAIGG 53.90   HEPBSAG Non-Reactive   HEPCAB Non-Reactive          A/P:Evonne Olvera is a 27 y.o. female  at 36w6d who presents for routine obstetric follow-up.    P:  Continue FKCs.    Labor and return precautions given.  Instructions given on where to go - patient verbalized understanding.  All questions answered. Anticipatory guidance.  Encouraged adequate water intake.  PP contraception: undecided  GBS collected today  Vertex on limited US today  F/u in 1 week(s) and PRN      Pregnancy complicated by:  Patient Active Problem List   Diagnosis    Hx of preeclampsia, prior pregnancy, currently pregnant, second trimester    Encounter for supervision of normal pregnancy in multigravida in second trimester    Anxiety - therapy and Vistartil started 23       The patient will follow up in 1 week(s). She was counseled to call and/or report to L&D for vaginal bleeding, regular contractions, leakage of fluid or decreased fetal movement.    Jensen Lambert D.O.

## 2023-04-27 LAB — GP B STREP DNA SPEC QL NAA+PROBE: POSITIVE

## 2023-05-03 ENCOUNTER — ROUTINE PRENATAL (OUTPATIENT)
Dept: OBGYN | Facility: CLINIC | Age: 28
End: 2023-05-03
Payer: COMMERCIAL

## 2023-05-03 VITALS — BODY MASS INDEX: 33.47 KG/M2 | DIASTOLIC BLOOD PRESSURE: 82 MMHG | SYSTOLIC BLOOD PRESSURE: 124 MMHG | WEIGHT: 183 LBS

## 2023-05-03 DIAGNOSIS — Z3A.37 37 WEEKS GESTATION OF PREGNANCY: ICD-10-CM

## 2023-05-03 DIAGNOSIS — F41.9 ANXIETY: ICD-10-CM

## 2023-05-03 DIAGNOSIS — Z34.83 ENCOUNTER FOR SUPERVISION OF NORMAL PREGNANCY IN MULTIGRAVIDA IN THIRD TRIMESTER: ICD-10-CM

## 2023-05-03 DIAGNOSIS — O09.293 HX OF PREECLAMPSIA, PRIOR PREGNANCY, CURRENTLY PREGNANT, THIRD TRIMESTER: ICD-10-CM

## 2023-05-03 PROCEDURE — 0502F SUBSEQUENT PRENATAL CARE: CPT | Performed by: STUDENT IN AN ORGANIZED HEALTH CARE EDUCATION/TRAINING PROGRAM

## 2023-05-03 NOTE — PROGRESS NOTES
OB Visit Note - 37w6d     MEDICAL DECISION MAKING:  Evonne Olvera is a 27 y.o. female  at 37w6d     S: Pt presents for routine OB follow up. Good fetal movement. No contractions, vaginal bleeding, or leakage of fluid.  Questions answered.     O:  Vitals:    23 1541   BP: 124/82     Patients' weight gain, fluid intake and exercise level discussed.    FH: 38 cm  FHT: 142 bpm       See flow sheet.        Recent Labs     23  1303   ABOGROUP O   RUBELLAIGG 53.90   HEPBSAG Non-Reactive   HEPCAB Non-Reactive          A/P:Evonne Olvera is a 27 y.o. female  at 37w6d who presents for routine obstetric follow-up.    P:  Continue FKCs.    Labor and return precautions given.  Instructions given on where to go - patient verbalized understanding.  All questions answered. Anticipatory guidance.  Encouraged adequate water intake.  PP contraception: undecided  GBS positive  F/u in 1 week(s) and PRN     Pregnancy complicated by:  Patient Active Problem List   Diagnosis    Hx of preeclampsia, prior pregnancy, currently pregnant, second trimester    Encounter for supervision of normal pregnancy in multigravida in second trimester    Anxiety - therapy and Vistartil started 23       The patient will follow up in 1 week(s). She was counseled to call and/or report to L&D for vaginal bleeding, regular contractions, leakage of fluid or decreased fetal movement.    Jensen Lambert D.O.

## 2023-05-07 ENCOUNTER — ANESTHESIA EVENT (OUTPATIENT)
Dept: ANESTHESIOLOGY | Facility: MEDICAL CENTER | Age: 28
End: 2023-05-07
Payer: COMMERCIAL

## 2023-05-07 ENCOUNTER — ANESTHESIA (OUTPATIENT)
Dept: ANESTHESIOLOGY | Facility: MEDICAL CENTER | Age: 28
End: 2023-05-07
Payer: COMMERCIAL

## 2023-05-07 ENCOUNTER — HOSPITAL ENCOUNTER (INPATIENT)
Facility: MEDICAL CENTER | Age: 28
LOS: 2 days | End: 2023-05-09
Attending: OBSTETRICS & GYNECOLOGY | Admitting: OBSTETRICS & GYNECOLOGY
Payer: COMMERCIAL

## 2023-05-07 LAB
ALBUMIN SERPL BCP-MCNC: 3.7 G/DL (ref 3.2–4.9)
ALBUMIN/GLOB SERPL: 1.4 G/DL
ALP SERPL-CCNC: 171 U/L (ref 30–99)
ALT SERPL-CCNC: 13 U/L (ref 2–50)
ANION GAP SERPL CALC-SCNC: 17 MMOL/L (ref 7–16)
AST SERPL-CCNC: 17 U/L (ref 12–45)
BASOPHILS # BLD AUTO: 0.2 % (ref 0–1.8)
BASOPHILS # BLD: 0.02 K/UL (ref 0–0.12)
BILIRUB SERPL-MCNC: <0.2 MG/DL (ref 0.1–1.5)
BUN SERPL-MCNC: 9 MG/DL (ref 8–22)
CALCIUM ALBUM COR SERPL-MCNC: 8.9 MG/DL (ref 8.5–10.5)
CALCIUM SERPL-MCNC: 8.7 MG/DL (ref 8.5–10.5)
CHLORIDE SERPL-SCNC: 108 MMOL/L (ref 96–112)
CO2 SERPL-SCNC: 17 MMOL/L (ref 20–33)
CREAT SERPL-MCNC: 0.59 MG/DL (ref 0.5–1.4)
CREAT UR-MCNC: 47.14 MG/DL
EOSINOPHIL # BLD AUTO: 0.1 K/UL (ref 0–0.51)
EOSINOPHIL NFR BLD: 0.9 % (ref 0–6.9)
ERYTHROCYTE [DISTWIDTH] IN BLOOD BY AUTOMATED COUNT: 49.7 FL (ref 35.9–50)
GFR SERPLBLD CREATININE-BSD FMLA CKD-EPI: 127 ML/MIN/1.73 M 2
GLOBULIN SER CALC-MCNC: 2.6 G/DL (ref 1.9–3.5)
GLUCOSE SERPL-MCNC: 87 MG/DL (ref 65–99)
HCT VFR BLD AUTO: 44.9 % (ref 37–47)
HGB BLD-MCNC: 15.1 G/DL (ref 12–16)
HOLDING TUBE BB 8507: NORMAL
IMM GRANULOCYTES # BLD AUTO: 0.08 K/UL (ref 0–0.11)
IMM GRANULOCYTES NFR BLD AUTO: 0.7 % (ref 0–0.9)
LYMPHOCYTES # BLD AUTO: 1.76 K/UL (ref 1–4.8)
LYMPHOCYTES NFR BLD: 15.9 % (ref 22–41)
MCH RBC QN AUTO: 32.2 PG (ref 27–33)
MCHC RBC AUTO-ENTMCNC: 33.6 G/DL (ref 33.6–35)
MCV RBC AUTO: 95.7 FL (ref 81.4–97.8)
MONOCYTES # BLD AUTO: 0.58 K/UL (ref 0–0.85)
MONOCYTES NFR BLD AUTO: 5.2 % (ref 0–13.4)
NEUTROPHILS # BLD AUTO: 8.51 K/UL (ref 2–7.15)
NEUTROPHILS NFR BLD: 77.1 % (ref 44–72)
NRBC # BLD AUTO: 0 K/UL
NRBC BLD-RTO: 0 /100 WBC
PLATELET # BLD AUTO: 246 K/UL (ref 164–446)
PMV BLD AUTO: 9.6 FL (ref 9–12.9)
POTASSIUM SERPL-SCNC: 4 MMOL/L (ref 3.6–5.5)
PROT SERPL-MCNC: 6.3 G/DL (ref 6–8.2)
PROT UR-MCNC: 6 MG/DL (ref 0–15)
PROT/CREAT UR: 127 MG/G (ref 10–107)
RBC # BLD AUTO: 4.69 M/UL (ref 4.2–5.4)
SODIUM SERPL-SCNC: 142 MMOL/L (ref 135–145)
T PALLIDUM AB SER QL IA: NORMAL
WBC # BLD AUTO: 11.1 K/UL (ref 4.8–10.8)

## 2023-05-07 PROCEDURE — 770002 HCHG ROOM/CARE - OB PRIVATE (112)

## 2023-05-07 PROCEDURE — 700111 HCHG RX REV CODE 636 W/ 250 OVERRIDE (IP)

## 2023-05-07 PROCEDURE — 59025 FETAL NON-STRESS TEST: CPT

## 2023-05-07 PROCEDURE — 700105 HCHG RX REV CODE 258: Performed by: NURSE PRACTITIONER

## 2023-05-07 PROCEDURE — 700111 HCHG RX REV CODE 636 W/ 250 OVERRIDE (IP): Performed by: NURSE PRACTITIONER

## 2023-05-07 PROCEDURE — 82570 ASSAY OF URINE CREATININE: CPT

## 2023-05-07 PROCEDURE — 700101 HCHG RX REV CODE 250: Performed by: NURSE PRACTITIONER

## 2023-05-07 PROCEDURE — 86780 TREPONEMA PALLIDUM: CPT

## 2023-05-07 PROCEDURE — 700111 HCHG RX REV CODE 636 W/ 250 OVERRIDE (IP): Performed by: ANESTHESIOLOGY

## 2023-05-07 PROCEDURE — 36415 COLL VENOUS BLD VENIPUNCTURE: CPT

## 2023-05-07 PROCEDURE — 700105 HCHG RX REV CODE 258: Performed by: ANESTHESIOLOGY

## 2023-05-07 PROCEDURE — 700102 HCHG RX REV CODE 250 W/ 637 OVERRIDE(OP): Performed by: NURSE PRACTITIONER

## 2023-05-07 PROCEDURE — 80053 COMPREHEN METABOLIC PANEL: CPT

## 2023-05-07 PROCEDURE — 700105 HCHG RX REV CODE 258

## 2023-05-07 PROCEDURE — 59409 OBSTETRICAL CARE: CPT

## 2023-05-07 PROCEDURE — 01967 NEURAXL LBR ANES VAG DLVR: CPT | Performed by: ANESTHESIOLOGY

## 2023-05-07 PROCEDURE — 700101 HCHG RX REV CODE 250: Performed by: ANESTHESIOLOGY

## 2023-05-07 PROCEDURE — 84156 ASSAY OF PROTEIN URINE: CPT

## 2023-05-07 PROCEDURE — 303615 HCHG EPIDURAL/SPINAL ANESTHESIA FOR LABOR

## 2023-05-07 PROCEDURE — 85025 COMPLETE CBC W/AUTO DIFF WBC: CPT

## 2023-05-07 PROCEDURE — A9270 NON-COVERED ITEM OR SERVICE: HCPCS | Performed by: NURSE PRACTITIONER

## 2023-05-07 PROCEDURE — 59400 OBSTETRICAL CARE: CPT | Performed by: OBSTETRICS & GYNECOLOGY

## 2023-05-07 PROCEDURE — 304965 HCHG RECOVERY SERVICES

## 2023-05-07 RX ORDER — ACETAMINOPHEN 500 MG
1000 TABLET ORAL EVERY 6 HOURS PRN
Status: DISCONTINUED | OUTPATIENT
Start: 2023-05-07 | End: 2023-05-09 | Stop reason: HOSPADM

## 2023-05-07 RX ORDER — EPHEDRINE SULFATE 50 MG/ML
5 INJECTION, SOLUTION INTRAVENOUS
Status: DISCONTINUED | OUTPATIENT
Start: 2023-05-07 | End: 2023-05-07 | Stop reason: HOSPADM

## 2023-05-07 RX ORDER — ALUMINA, MAGNESIA, AND SIMETHICONE 2400; 2400; 240 MG/30ML; MG/30ML; MG/30ML
30 SUSPENSION ORAL EVERY 6 HOURS PRN
Status: DISCONTINUED | OUTPATIENT
Start: 2023-05-07 | End: 2023-05-07 | Stop reason: HOSPADM

## 2023-05-07 RX ORDER — LIDOCAINE HYDROCHLORIDE AND EPINEPHRINE 15; 5 MG/ML; UG/ML
INJECTION, SOLUTION EPIDURAL
Status: COMPLETED | OUTPATIENT
Start: 2023-05-07 | End: 2023-05-07

## 2023-05-07 RX ORDER — TERBUTALINE SULFATE 1 MG/ML
0.25 INJECTION, SOLUTION SUBCUTANEOUS
Status: DISCONTINUED | OUTPATIENT
Start: 2023-05-07 | End: 2023-05-07 | Stop reason: HOSPADM

## 2023-05-07 RX ORDER — SODIUM CHLORIDE, SODIUM LACTATE, POTASSIUM CHLORIDE, CALCIUM CHLORIDE 600; 310; 30; 20 MG/100ML; MG/100ML; MG/100ML; MG/100ML
INJECTION, SOLUTION INTRAVENOUS PRN
Status: DISCONTINUED | OUTPATIENT
Start: 2023-05-07 | End: 2023-05-09 | Stop reason: HOSPADM

## 2023-05-07 RX ORDER — SODIUM CHLORIDE, SODIUM LACTATE, POTASSIUM CHLORIDE, CALCIUM CHLORIDE 600; 310; 30; 20 MG/100ML; MG/100ML; MG/100ML; MG/100ML
INJECTION, SOLUTION INTRAVENOUS CONTINUOUS
Status: DISCONTINUED | OUTPATIENT
Start: 2023-05-07 | End: 2023-05-09 | Stop reason: HOSPADM

## 2023-05-07 RX ORDER — ACETAMINOPHEN 500 MG
1000 TABLET ORAL
Status: COMPLETED | OUTPATIENT
Start: 2023-05-07 | End: 2023-05-07

## 2023-05-07 RX ORDER — OXYTOCIN 10 [USP'U]/ML
10 INJECTION, SOLUTION INTRAMUSCULAR; INTRAVENOUS
Status: DISCONTINUED | OUTPATIENT
Start: 2023-05-07 | End: 2023-05-07 | Stop reason: HOSPADM

## 2023-05-07 RX ORDER — ONDANSETRON 2 MG/ML
4 INJECTION INTRAMUSCULAR; INTRAVENOUS EVERY 6 HOURS PRN
Status: DISCONTINUED | OUTPATIENT
Start: 2023-05-07 | End: 2023-05-07 | Stop reason: HOSPADM

## 2023-05-07 RX ORDER — IBUPROFEN 800 MG/1
800 TABLET ORAL EVERY 8 HOURS PRN
Status: DISCONTINUED | OUTPATIENT
Start: 2023-05-07 | End: 2023-05-09 | Stop reason: HOSPADM

## 2023-05-07 RX ORDER — ONDANSETRON 4 MG/1
4 TABLET, ORALLY DISINTEGRATING ORAL EVERY 6 HOURS PRN
Status: DISCONTINUED | OUTPATIENT
Start: 2023-05-07 | End: 2023-05-07 | Stop reason: HOSPADM

## 2023-05-07 RX ORDER — IBUPROFEN 800 MG/1
800 TABLET ORAL
Status: COMPLETED | OUTPATIENT
Start: 2023-05-07 | End: 2023-05-07

## 2023-05-07 RX ORDER — MISOPROSTOL 200 UG/1
800 TABLET ORAL
Status: COMPLETED | OUTPATIENT
Start: 2023-05-07 | End: 2023-05-07

## 2023-05-07 RX ORDER — ROPIVACAINE HYDROCHLORIDE 2 MG/ML
INJECTION, SOLUTION EPIDURAL; INFILTRATION; PERINEURAL CONTINUOUS
Status: DISCONTINUED | OUTPATIENT
Start: 2023-05-07 | End: 2023-05-08 | Stop reason: HOSPADM

## 2023-05-07 RX ORDER — SODIUM CHLORIDE, SODIUM LACTATE, POTASSIUM CHLORIDE, AND CALCIUM CHLORIDE .6; .31; .03; .02 G/100ML; G/100ML; G/100ML; G/100ML
1000 INJECTION, SOLUTION INTRAVENOUS
Status: COMPLETED | OUTPATIENT
Start: 2023-05-07 | End: 2023-05-07

## 2023-05-07 RX ORDER — SODIUM CHLORIDE, SODIUM LACTATE, POTASSIUM CHLORIDE, AND CALCIUM CHLORIDE .6; .31; .03; .02 G/100ML; G/100ML; G/100ML; G/100ML
250 INJECTION, SOLUTION INTRAVENOUS PRN
Status: DISCONTINUED | OUTPATIENT
Start: 2023-05-07 | End: 2023-05-07 | Stop reason: HOSPADM

## 2023-05-07 RX ORDER — LIDOCAINE HYDROCHLORIDE 10 MG/ML
20 INJECTION, SOLUTION INFILTRATION; PERINEURAL
Status: DISCONTINUED | OUTPATIENT
Start: 2023-05-07 | End: 2023-05-07 | Stop reason: HOSPADM

## 2023-05-07 RX ORDER — BUPIVACAINE HYDROCHLORIDE 2.5 MG/ML
INJECTION, SOLUTION EPIDURAL; INFILTRATION; INTRACAUDAL
Status: COMPLETED | OUTPATIENT
Start: 2023-05-07 | End: 2023-05-07

## 2023-05-07 RX ORDER — ROPIVACAINE HYDROCHLORIDE 2 MG/ML
INJECTION, SOLUTION EPIDURAL; INFILTRATION; PERINEURAL
Status: COMPLETED
Start: 2023-05-07 | End: 2023-05-07

## 2023-05-07 RX ORDER — DOCUSATE SODIUM 100 MG/1
100 CAPSULE, LIQUID FILLED ORAL 2 TIMES DAILY PRN
Status: DISCONTINUED | OUTPATIENT
Start: 2023-05-07 | End: 2023-05-09 | Stop reason: HOSPADM

## 2023-05-07 RX ADMIN — BUPIVACAINE HYDROCHLORIDE 8 ML: 2.5 INJECTION, SOLUTION EPIDURAL; INFILTRATION; INTRACAUDAL; PERINEURAL at 12:57

## 2023-05-07 RX ADMIN — MISOPROSTOL 800 MCG: 200 TABLET ORAL at 17:00

## 2023-05-07 RX ADMIN — SODIUM CHLORIDE, POTASSIUM CHLORIDE, SODIUM LACTATE AND CALCIUM CHLORIDE: 600; 310; 30; 20 INJECTION, SOLUTION INTRAVENOUS at 08:28

## 2023-05-07 RX ADMIN — LIDOCAINE HYDROCHLORIDE,EPINEPHRINE BITARTRATE 3 ML: 15; .005 INJECTION, SOLUTION EPIDURAL; INFILTRATION; INTRACAUDAL; PERINEURAL at 12:57

## 2023-05-07 RX ADMIN — OXYTOCIN 125 ML/HR: 10 INJECTION, SOLUTION INTRAMUSCULAR; INTRAVENOUS at 17:48

## 2023-05-07 RX ADMIN — SODIUM CHLORIDE, POTASSIUM CHLORIDE, SODIUM LACTATE AND CALCIUM CHLORIDE: 600; 310; 30; 20 INJECTION, SOLUTION INTRAVENOUS at 13:37

## 2023-05-07 RX ADMIN — IBUPROFEN 800 MG: 800 TABLET, FILM COATED ORAL at 17:51

## 2023-05-07 RX ADMIN — ROPIVACAINE HYDROCHLORIDE 200 MG: 2 INJECTION, SOLUTION EPIDURAL; INFILTRATION at 13:03

## 2023-05-07 RX ADMIN — OXYTOCIN 20 UNITS: 10 INJECTION, SOLUTION INTRAMUSCULAR; INTRAVENOUS at 16:36

## 2023-05-07 RX ADMIN — SODIUM CHLORIDE 2.5 MILLION UNITS: 9 INJECTION, SOLUTION INTRAVENOUS at 11:59

## 2023-05-07 RX ADMIN — SODIUM CHLORIDE 5 MILLION UNITS: 900 INJECTION INTRAVENOUS at 08:31

## 2023-05-07 RX ADMIN — SODIUM CHLORIDE 2.5 MILLION UNITS: 9 INJECTION, SOLUTION INTRAVENOUS at 15:57

## 2023-05-07 RX ADMIN — ACETAMINOPHEN 1000 MG: 500 TABLET, FILM COATED ORAL at 17:51

## 2023-05-07 RX ADMIN — ROPIVACAINE HYDROCHLORIDE 200 MG: 2 INJECTION, SOLUTION EPIDURAL; INFILTRATION; PERINEURAL at 13:03

## 2023-05-07 RX ADMIN — ONDANSETRON 4 MG: 2 INJECTION INTRAMUSCULAR; INTRAVENOUS at 11:55

## 2023-05-07 RX ADMIN — SODIUM CHLORIDE, POTASSIUM CHLORIDE, SODIUM LACTATE AND CALCIUM CHLORIDE 1000 ML: 600; 310; 30; 20 INJECTION, SOLUTION INTRAVENOUS at 12:53

## 2023-05-07 RX ADMIN — OXYTOCIN 2 MILLI-UNITS/MIN: 10 INJECTION, SOLUTION INTRAMUSCULAR; INTRAVENOUS at 11:33

## 2023-05-07 ASSESSMENT — PAIN DESCRIPTION - PAIN TYPE
TYPE: ACUTE PAIN
TYPE: ACUTE PAIN

## 2023-05-07 ASSESSMENT — PATIENT HEALTH QUESTIONNAIRE - PHQ9
8. MOVING OR SPEAKING SO SLOWLY THAT OTHER PEOPLE COULD HAVE NOTICED. OR THE OPPOSITE, BEING SO FIGETY OR RESTLESS THAT YOU HAVE BEEN MOVING AROUND A LOT MORE THAN USUAL: NOT AT ALL
2. FEELING DOWN, DEPRESSED, IRRITABLE, OR HOPELESS: SEVERAL DAYS
SUM OF ALL RESPONSES TO PHQ9 QUESTIONS 1 AND 2: 2
4. FEELING TIRED OR HAVING LITTLE ENERGY: SEVERAL DAYS
7. TROUBLE CONCENTRATING ON THINGS, SUCH AS READING THE NEWSPAPER OR WATCHING TELEVISION: NOT AT ALL
5. POOR APPETITE OR OVEREATING: NOT AT ALL
SUM OF ALL RESPONSES TO PHQ QUESTIONS 1-9: 5
9. THOUGHTS THAT YOU WOULD BE BETTER OFF DEAD, OR OF HURTING YOURSELF: NOT AT ALL
6. FEELING BAD ABOUT YOURSELF - OR THAT YOU ARE A FAILURE OR HAVE LET YOURSELF OR YOUR FAMILY DOWN: SEVERAL DAYS
1. LITTLE INTEREST OR PLEASURE IN DOING THINGS: SEVERAL DAYS
3. TROUBLE FALLING OR STAYING ASLEEP OR SLEEPING TOO MUCH: SEVERAL DAYS

## 2023-05-07 ASSESSMENT — COPD QUESTIONNAIRES
IN THE PAST 12 MONTHS DO YOU DO LESS THAN YOU USED TO BECAUSE OF YOUR BREATHING PROBLEMS: DISAGREE/UNSURE
DURING THE PAST 4 WEEKS HOW MUCH DID YOU FEEL SHORT OF BREATH: NONE/LITTLE OF THE TIME
DO YOU EVER COUGH UP ANY MUCUS OR PHLEGM?: NO/ONLY WITH OCCASIONAL COLDS OR INFECTIONS

## 2023-05-07 ASSESSMENT — LIFESTYLE VARIABLES
CONSUMPTION TOTAL: INCOMPLETE
HAVE PEOPLE ANNOYED YOU BY CRITICIZING YOUR DRINKING: NO
TOTAL SCORE: 0
EVER HAD A DRINK FIRST THING IN THE MORNING TO STEADY YOUR NERVES TO GET RID OF A HANGOVER: NO
EVER FELT BAD OR GUILTY ABOUT YOUR DRINKING: NO
TOTAL SCORE: 0
TOTAL SCORE: 0
HAVE YOU EVER FELT YOU SHOULD CUT DOWN ON YOUR DRINKING: NO
ALCOHOL_USE: NO
EVER_SMOKED: YES

## 2023-05-07 ASSESSMENT — PAIN SCALES - GENERAL: PAIN_LEVEL: 0

## 2023-05-07 NOTE — L&D DELIVERY NOTE
Vaginal Delivery Procedure Note:    Evonne Olvera is a 27 y.o. , admitted for active labor.  Her labor course included Pitocin for augmentation of labor.    PreDelivery Diagnosis:  1. SIUP @ 38w3d    PostDelivery Diagnosis:  1. S/p     Procedure in Detail:  Pt pushing dorsal lithotomy position.  Head delivered easily and restituted towards maternal left.  Anterior shoulder followed easily with gentle downwards pressure followed by the posterior shoulder and body.  female infant delivered @ 1633.  There was no nuchal cord.  Infant was placed on the maternal abdomen and was stimulated and bulb suctioned.  Cord clamping was delayed x60 seconds then the cord was clamped x2 and cut.  Infant APGARs 8 and 9 and 1 and 5 minutes, respectively.  Birth weight pending.  Cord gases were not sent.  IV pitocin bolus started.  Placenta delivered spontaneously intact at 1637. 3 Vessel cord.  The vagina and perineum were examined revealing no lacerations.  The uterus was firm with IV pitocin and fundal massage.  Pt and infant left bonding in LDR.    EBL 50  Anesthesia - Epidural  Sponge and needle counts correct.  Patient tolerated procedure well.    Anticipate routine postparum care.        Danielle Stewart M.D.

## 2023-05-07 NOTE — H&P
History and Physical      Evonne Stephanie Olvera is a 27 y.o. year old female  at 38w3d who presents for SROM at 0645    Pregnancy complicated by hx of preE, anxiety and GBS positive    Subjective:   positive  For CTXS.   positive Feels pain   positive for LOF  negative for vaginal bleeding.   positive for fetal movement    ROS: Pertinent items are noted in HPI.    Past Medical History:   Diagnosis Date    Anxiety     Depression     Head ache     Heart murmur     Hypertension     PIH    Migraine      Past Surgical History:   Procedure Laterality Date    [id number] Left     Pt states had screws put in    TONSILLECTOMY      age 11     OB History    Para Term  AB Living   2 1 1     1   SAB IAB Ectopic Molar Multiple Live Births             1      # Outcome Date GA Lbr Marc/2nd Weight Sex Delivery Anes PTL Lv   2 Current            1 Term 21 38w0d  3.629 kg (8 lb) F Vag-Spont EPI N KYLE      Birth Comments: Pt states was induced due to preeclampsia     Social History     Socioeconomic History    Marital status:      Spouse name: Not on file    Number of children: Not on file    Years of education: Not on file    Highest education level: Not on file   Occupational History    Not on file   Tobacco Use    Smoking status: Every Day    Smokeless tobacco: Never   Vaping Use    Vaping Use: Never used   Substance and Sexual Activity    Alcohol use: Never    Drug use: Never    Sexual activity: Yes     Partners: Male     Comment: none   Other Topics Concern    Not on file   Social History Narrative    Not on file     Social Determinants of Health     Financial Resource Strain: Not on file   Food Insecurity: Not on file   Transportation Needs: Not on file   Physical Activity: Not on file   Stress: Not on file   Social Connections: Not on file   Intimate Partner Violence: Not on file   Housing Stability: Not on file     Allergies: Patient has no known allergies.    Current Facility-Administered  Medications:     LR infusion, , Intravenous, Continuous, Mamta Lr, A.P.R.N.    lidocaine (XYLOCAINE) 1%  injection, 20 mL, Subcutaneous, Once PRN, Mamta Lr, A.P.R.N.    terbutaline (BRETHINE) injection 0.25 mg, 0.25 mg, Subcutaneous, Once PRN, Mamta Lr, A.P.R.N.    oxytocin (Pitocin) infusion bolus (for post delivery), 20 Units, Intravenous, Once **FOLLOWED BY** oxytocin (Pitocin) infusion (for post delivery), 125 mL/hr, Intravenous, Continuous, Mamta Lr, A.P.R.N.    oxytocin (PITOCIN) injection 10 Units, 10 Units, Intramuscular, Once PRN, Mamta Lr, A.P.R.N.    ibuprofen (MOTRIN) tablet 800 mg, 800 mg, Oral, Once PRN, Mamta Lr, A.P.R.N.    acetaminophen (TYLENOL) tablet 1,000 mg, 1,000 mg, Oral, Once PRN, Mamta Lr, A.P.R.N.    fentaNYL (SUBLIMAZE) injection 50 mcg, 50 mcg, Intravenous, Q HOUR PRN **OR** fentaNYL (SUBLIMAZE) injection 100 mcg, 100 mcg, Intravenous, Q HOUR PRN, Mamta Lr, A.P.R.N.    penicillin G potassium 5 Million Units in  mL IVPB, 5 Million Units, Intravenous, Once **FOLLOWED BY** penicillin G potassium 2.5 million units in  mL IVPB, 2.5 Million Units, Intravenous, Q4HRS, Mamta Lr, A.P.R.N.    ondansetron (ZOFRAN ODT) dispertab 4 mg, 4 mg, Oral, Q6HRS PRN **OR** ondansetron (ZOFRAN) syringe/vial injection 4 mg, 4 mg, Intravenous, Q6HRS PRN, Mamta Lr, A.P.R.N.    mag hydrox-al hydrox-simeth (MAALOX PLUS ES or MYLANTA DS) suspension 30 mL, 30 mL, Oral, Q6HRS PRN, Mamta Lr, A.P.R.N.    miSOPROStol (CYTOTEC) tablet 800 mcg, 800 mcg, Rectal, Once PRN, MOHINDER Joiner    Prenatal care with [facility] with following problems:  Patient Active Problem List    Diagnosis Date Noted    Labor and delivery, indication for care 05/07/2023    Anxiety - therapy and Vistartil started 1/19/23 01/19/2023    Encounter for supervision of normal pregnancy in multigravida in second trimester 12/21/2022    Hx of preeclampsia, prior pregnancy,  "currently pregnant, second trimester 12/07/2022         Objective:      BP (!) 139/94   Pulse 78   Temp 36.3 °C (97.4 °F) (Temporal)   Resp 16   Ht 1.575 m (5' 2\")   Wt 83 kg (183 lb)   SpO2 98%     General:   no acute distress   Skin:   normal   HEENT:  PERRLA   Lungs:   CTA bilateral   Heart:   S1, S2 normal, no murmur, click, rub or gallop, regular rate and rhythm, brisk carotid upstroke without bruits, peripheral pulses very brisk, chest is clear without rales or wheezing, no pedal edema, no JVD, no hepatosplenomegaly   Abdomen:   gravid, NT   EFW:  1516-3918 g   Pelvis:  Exam deferred., proven to 3629 g   FHTs: 145 BPM P+ accels - decels moderate variability   Contractions: Contractions q 2-3 min firm to palpation   Uterine Size: S=D   Presentations: Cephalic per RN   Cervix: Per RN    Dilation: 4cm    Effacement: 70%    Station:  -2    Consistency: Soft    Position: Middle     Complete OB US  1/12/2023 12:31 PM     HISTORY/REASON FOR EXAM:  Evaluate fetal anatomy.     TECHNIQUE/EXAM DESCRIPTION: OB complete ultrasound.     COMPARISON:  None     FINDINGS:  Fetal Lie:  Vertex  LMP:  8/11/2022  Clinical TYLER by LMP:  5/18/2023     Placenta (Location):  Anterior heterogenous  Placenta Previa: No  Placental ndGndrndanddndend:nd nd2nd Amniotic Fluid Volume:  KADEN = 14.85 cm     Fetal Heart Rate:  139 bpm     Cervical Length:  3.88 cm transabdominal     No maternal adnexal mass is identified.     Umbilical Artery S/D Ratio(s):  Not applicable     Fetal Anatomy  (Seen or Not Seen)  Lateral Ventricles     Seen  Cisterna Magna        Seen  Cerebellum              Seen  CSP             Seen  Orbits             Seen  Face/Lips                Seen  Cord Insertion         Seen  Placental CI         Seen  4 Chamber Heart     Seen  LVOT               Seen  RVOT              Seen  3 Vessel View     Seen  Stomach       Seen  Kidneys                   Seen  Urinary Bladder      Seen  Spine                       Seen  3 Vessel Cord         "  Seen  Both Upper Extremities    Seen  Both Lower Extremities    Seen  Diaphragm             Seen  Movement       Seen  Gender:  Likely female     Fetal Biometry  BPD    5.43 cm, 22 weeks 4 days, (67.9%)  HC    21.14 cm, 23 weeks 1 day, (84.4%)  AC    17.88 cm, 22 weeks 5 days, (67.0%)  Femur Length    4.19 cm, 23 weeks 4 days, (87.9%)  Humerus Length    3.91 cm, 23 weeks 6 days, (95.4%)  Cerebellum Diameter   2.48 cm, 22 weeks 4 days, (88.5%)     EGA by this US:  23 weeks 1 day  TYLER by this US: 5/10/2023  TYLER by 1st US:  5/11/2023     Estimated Fetal Weight:  565 grams  EFW Percentile: 91.6%     Comments:  Nonspecific echogenic focus in the left ventricle of the fetal heart. There is minimal debris seen in the fetal stomach.     IMPRESSION:     1.  Single intrauterine pregnancy of an estimated gestational age of 23 weeks 1 day with an estimated date of delivery of 5/10/2023.  2.  Nonspecific echogenic focus in the left ventricle of the fetal heart.  3.  Fetal survey is otherwise within normal limits.    Lab Review  Lab:   Blood type: O     Recent Results (from the past 5880 hour(s))   THINPREP PAP W/HPV AND CTNG    Collection Time: 12/21/22  2:55 PM   Result Value Ref Range    Cytology Reg See Path Report     Source Cervical     HPV Genotype 16 Negative Negative    HPV Genotype 18 Negative Negative    HPV Other High Risk Genotypes Negative Negative    C. trachomatis by PCR Negative Negative    N. gonorrhoeae by PCR Negative Negative    Source Cervical    AFP TETRA    Collection Time: 01/05/23  1:03 PM   Result Value Ref Range    AFP Value -Eia 82 ng/mL    AFP MOM Value 1.19     Ue3 Value 3.89 ng/mL    Ue3 Mom 1.39     Patient's hCG, 2nd Trimester 51972 IU/L    hCG MoM, 2nd Trimester 1.80     Eugenia Value -Eia 382 pg/mL    [place]enia Mom Value 2.10     Interpretation Screen Neg     [age] TYLER 26[age]8 yr    Maternal Weight 153.0 lbs.     Gest. Age on Collection Date 21 wks, 0 days     Gestational Age Based On Other      Multiple Pregnancy Zavala     Race Nonblack     Insulin Dependent Diabetes No     Smoking No     Family Hx NTD No     Family Hx of Aneuploidy No     Specimen See Note     EER Quad, Maternal Serum See Note    PREG CNTR PRENATAL PN    Collection Time: 01/05/23  1:03 PM   Result Value Ref Range    WBC 10.7 4.8 - 10.8 K/uL    RBC 4.23 4.20 - 5.40 M/uL    Hemoglobin 14.1 12.0 - 16.0 g/dL    Hematocrit 41.7 37.0 - 47.0 %    MCV 98.6 (H) 81.4 - 97.8 fL    MCH 33.3 (H) 27.0 - 33.0 pg    MCHC 33.8 33.6 - 35.0 g/dL    RDW 51.4 (H) 35.9 - 50.0 fL    Platelet Count 279 164 - 446 K/uL    MPV 10.0 9.0 - 12.9 fL    Hepatitis C Antibody Non-Reactive Non-Reactive    Hepatitis B Surface Antigen Non-Reactive Non-Reactive    Rubella IgG Antibody 53.90 IU/mL    Syphilis, Treponemal Qual Non-Reactive Non-Reactive   URINE CULTURE(NEW)    Collection Time: 01/05/23  1:03 PM    Specimen: Urine   Result Value Ref Range    Significant Indicator NEG     Source UR     Site Clean Catch     Culture Result Usual urogenital micky 10-50,000 cfu/mL    HIV AG/AB COMBO ASSAY SCREENING    Collection Time: 01/05/23  1:03 PM   Result Value Ref Range    HIV Ag/Ab Combo Assay Non-Reactive Non Reactive   OP Prenatal Panel-Blood Bank    Collection Time: 01/05/23  1:03 PM   Result Value Ref Range    ABO Grouping Only O     Rh Grouping Only POS     Antibody Screen Scrn NEG    T.PALLIDUM AB STERLING (SCREENING)    Collection Time: 02/22/23  1:57 PM   Result Value Ref Range    Syphilis, Treponemal Qual Non-Reactive Non-Reactive   CBC WITHOUT DIFFERENTIAL    Collection Time: 02/22/23  1:57 PM   Result Value Ref Range    WBC 13.2 (H) 4.8 - 10.8 K/uL    RBC 4.17 (L) 4.20 - 5.40 M/uL    Hemoglobin 13.8 12.0 - 16.0 g/dL    Hematocrit 41.2 37.0 - 47.0 %    MCV 98.8 (H) 81.4 - 97.8 fL    MCH 33.1 (H) 27.0 - 33.0 pg    MCHC 33.5 (L) 33.6 - 35.0 g/dL    RDW 50.8 (H) 35.9 - 50.0 fL    Platelet Count 254 164 - 446 K/uL    MPV 9.6 9.0 - 12.9 fL   GLUCOSE 1HR GESTATIONAL     Collection Time: 02/22/23  1:57 PM   Result Value Ref Range    Glucose, Post Dose 132 70 - 139 mg/dL   GRP B STREP, BY PCR (PAK BROTH)    Collection Time: 04/26/23  9:37 AM    Specimen: Genital   Result Value Ref Range    Strep Gp B DNA PCR POSITIVE (A) Negative        Assessment:   1.  IUP at 38w3d  2.  Labor status: Active phase labor.  3.  Cat 1 FHTs  4.  Obstetrical history significant for   Patient Active Problem List    Diagnosis Date Noted    Labor and delivery, indication for care 05/07/2023    Anxiety - therapy and Vistartil started 1/19/23 01/19/2023    Encounter for supervision of normal pregnancy in multigravida in second trimester 12/21/2022    Hx of preeclampsia, prior pregnancy, currently pregnant, second trimester 12/07/2022   .      Plan:     Admit to L&D  GBS positive-PCN  Routine labs  PIH labs  PCR  Pain management prn    Mamta Lr, CORAM, APRN

## 2023-05-07 NOTE — ANESTHESIA TIME REPORT
Anesthesia Start and Stop Event Times     Date Time Event    5/7/2023 1251 Anesthesia Start     1320 Ready for Procedure     1633 Anesthesia Stop        Responsible Staff  05/07/23    Name Role Begin End    Adolfo Haskins M.D. Anesth 1251 1633        Overtime Reason:  no overtime (within assigned shift)    Comments:

## 2023-05-07 NOTE — ANESTHESIA POSTPROCEDURE EVALUATION
Patient: Evonne Olvera    Procedure Summary     Date: 05/07/23 Room / Location:     Anesthesia Start: 1251 Anesthesia Stop: 1633    Procedure: Labor Epidural Diagnosis:     Scheduled Providers:  Responsible Provider: Adolfo Haskins M.D.    Anesthesia Type: epidural ASA Status: 2          Final Anesthesia Type: epidural  Last vitals  BP   Blood Pressure: 133/74    Temp   36.3 °C (97.3 °F)    Pulse   78   Resp   16    SpO2   94 %      Anesthesia Post Evaluation    Patient location during evaluation: PACU  Patient participation: complete - patient participated  Level of consciousness: awake and alert  Pain score: 0    Airway patency: patent  Anesthetic complications: no  Cardiovascular status: hemodynamically stable  Respiratory status: acceptable  Hydration status: euvolemic    PONV: none          No notable events documented.

## 2023-05-07 NOTE — CARE PLAN
The patient is Stable - Low risk of patient condition declining or worsening    Shift Goals  Clinical Goals: cervical dilation and effacement  Patient Goals: pain control, healthy mom and baby  Family Goals: provide emotional support        Problem: Knowledge Deficit - L&D  Goal: Patient and family/caregivers will demonstrate understanding of plan of care, disease process/condition, diagnostic tests and medications  Outcome: Progressing     Problem: Risk for Excess Fluid Volume  Goal: Patient will demonstrate pulse, blood pressure and neurologic signs within expected ranges and without any respiratory complications  Outcome: Progressing

## 2023-05-07 NOTE — PROGRESS NOTES
"S: Patient feeling contractions, getting uncomfortable    O:BP (!) 139/94   Pulse 78   Temp 36.3 °C (97.4 °F) (Temporal)   Resp 16   Ht 1.575 m (5' 2\")   Wt 83 kg (183 lb)   SpO2 98%      [place] - irregular contractions  EFM - 130s, moderate variability, + accels  Cx - 4-5 cm/50%/-2    A/P: 27-year-old -0-0-1 at 38-3/7 weeks admitted for spontaneous rupture of membranes.  Positive GBS, receiving IV penicillin.  Plan to start Pitocin augmentation of labor.  Anticipate .  "

## 2023-05-07 NOTE — ED PROVIDER NOTES
"  PATIENT ID:  NAME:  Evonne Olvera  MRN:               [id number]  YOB: 1995     27 y.o. female  at 38w3d.    Subjective: Pt reports LOF at 0645  Pregnancy complicated by hx of preE, anxiety, GBS positive    positive  For CTXS.   positive Feels pain   positive for LOF  negative for vaginal bleeding.   positive for fetal movement    ROS: Patient denies any fever chills, nausea, vomiting, headache, chest pain, shortness of breath, or dysuria or unusual swelling of hands or feet.     Objective:    Vitals:    23 0721   BP: (!) 139/94   Pulse: 78   Temp: 36.3 °C (97.4 °F)   TempSrc: Temporal   SpO2: 98%   Weight: 83 kg (183 lb)   Height: 1.575 m (5' 2\")     Temp (24hrs), Av.3 °C (97.4 °F), Min:36.3 °C (97.4 °F), Max:36.3 °C (97.4 °F)    General: No acute distress, resting comfortably in bed.  HEENT: normocephalic, nontraumatic, PERRLA, EOMI  Cardiovascular: Heart RRR with no murmurs, rubs or gallops. Distal Pulses 2+  Respiratory: symmetric chest expansion, lungs CTAB, with no wheezes, rales, rhonci  Abdomen: gravid, nontender  Musculoskeletal: strength 5/5 in four extremities  Neuro: non focal with no numbness, tingling or changes in sensation    Cervix:  4cm/70%/-2  [place]: Uterine Contractions Q2-3 minutes.   FHRM: Baseline 135, Accels to 160, no decels, moderate variability    Gross ROM noted    Assessment: 27 y.o. female  at 38w3d.      Plan:   Admit for labor management  "

## 2023-05-07 NOTE — PROGRESS NOTES
0735 report received from Triage RN Jossie   admitted to L&D after SROM, clear this AM    0745 pt ambulated self to labor room s217 with FOB at BS  [place] and EFM applied  Orders received from Mamta STEINER for CMP and protein creatinine ratio    0945 pt requested intermittent monitoring and to ambulate unit, per Stephanie PEARCE, okay to be off monitor for 30 mins to ambulate halls    1100 Stephanie PEARCE at   Orders received for pitocin 2 by 2    1235 pt requested pain control with epidural    1255 anesthesia Jozef PEARCE at BS for epidural procedure, placed without complication, pt tolerated well    1545 pt complete/+1  Orders received to push from Stephanie PEARCE    1633  of viable female infant, 8/9 apgars     1635  of intact placenta     1700 fundus boggy, at umbilicus, several large blood clots noted, 200 mL   Charge RN called to BS to assess fundus  800 mcg rectal cytotec given  Stephanie PEARCE updated    1715 fundus firm, at umbilicus, lochia moderate    1730 fundus firm, at umbilicus, lochia light    1900 report given to NOC LILIANE Norman

## 2023-05-07 NOTE — ANESTHESIA PROCEDURE NOTES
Epidural Block    Date/Time: 5/7/2023 12:57 PM  Performed by: Adolfo Haskins M.D.  Authorized by: Adolfo Haskins M.D.     Patient Location:  OB  Start Time:  5/7/2023 12:57 PM  Reason for Block: labor analgesia    patient identified, IV checked, site marked, risks and benefits discussed, surgical consent, monitors and equipment checked, pre-op evaluation and timeout performed    Patient Position:  Sitting  Prep: ChloraPrep, patient draped and sterile technique    Monitoring:  Blood pressure, continuous pulse oximetry and heart rate  Approach:  Midline  Location:  L3-L4  Injection Technique:  LC saline  Skin infiltration:  Lidocaine  Strength:  1%  Dose:  3ml  Needle Type:  Tuohy  Needle Gauge:  17 G  Needle Length:  3.5 in  Loss of resistance::  6  Catheter Size:  19 G  Catheter at Skin Depth:  13  Test Dose Result:  Negative

## 2023-05-07 NOTE — PROGRESS NOTES
Pt presents to L&D with complaints of ROM. Pt ambulated to LDA 2 for assessment.     0720 TOCO and EFM applied, VSS. Pt reports +FM, denies VB but states she thinks she lost her mucous plug this morning. Pt reports contractions that have been irregular since this morning but became more consistent after her water broke around 0645. Upon assessment pt grossly ruptured. SVE 4/70/-2.     0725 SHARDA Lr updated, orders for admission received.     0735 Pt transferred to S2 for admission. Report given to Cristiano MOMIN, POC discussed.

## 2023-05-07 NOTE — ANESTHESIA PREPROCEDURE EVALUATION
Date: 23  Procedure: Labor Epidural      in active labor. Full term. [place]. Painful contractions.     Epidural with prior delivery.     Relevant Problems   No relevant active problems       Physical Exam    Airway   Mallampati: II  TM distance: >3 FB  Neck ROM: full       Cardiovascular - normal exam  Rhythm: regular  Rate: normal  (-) murmur     Dental - normal exam           Pulmonary - normal exam  Breath sounds clear to auscultation     Abdominal    Neurological - normal exam                 Anesthesia Plan    ASA 2       Plan - epidural   Neuraxial block will be labor analgesia                  Pertinent diagnostic labs and testing reviewed    Informed Consent:    Anesthetic plan and risks discussed with patient.

## 2023-05-08 ENCOUNTER — PHARMACY VISIT (OUTPATIENT)
Dept: PHARMACY | Facility: MEDICAL CENTER | Age: 28
End: 2023-05-08
Payer: COMMERCIAL

## 2023-05-08 LAB
ERYTHROCYTE [DISTWIDTH] IN BLOOD BY AUTOMATED COUNT: 51.6 FL (ref 35.9–50)
HCT VFR BLD AUTO: 36 % (ref 37–47)
HGB BLD-MCNC: 12.2 G/DL (ref 12–16)
MCH RBC QN AUTO: 33.2 PG (ref 27–33)
MCHC RBC AUTO-ENTMCNC: 33.9 G/DL (ref 33.6–35)
MCV RBC AUTO: 98.1 FL (ref 81.4–97.8)
PLATELET # BLD AUTO: 190 K/UL (ref 164–446)
PMV BLD AUTO: 9.9 FL (ref 9–12.9)
RBC # BLD AUTO: 3.67 M/UL (ref 4.2–5.4)
WBC # BLD AUTO: 12.8 K/UL (ref 4.8–10.8)

## 2023-05-08 PROCEDURE — 85027 COMPLETE CBC AUTOMATED: CPT

## 2023-05-08 PROCEDURE — RXMED WILLOW AMBULATORY MEDICATION CHARGE: Performed by: PHYSICIAN ASSISTANT

## 2023-05-08 PROCEDURE — A9270 NON-COVERED ITEM OR SERVICE: HCPCS

## 2023-05-08 PROCEDURE — 700102 HCHG RX REV CODE 250 W/ 637 OVERRIDE(OP)

## 2023-05-08 PROCEDURE — 770002 HCHG ROOM/CARE - OB PRIVATE (112)

## 2023-05-08 PROCEDURE — 36415 COLL VENOUS BLD VENIPUNCTURE: CPT

## 2023-05-08 RX ORDER — IBUPROFEN 800 MG/1
800 TABLET ORAL EVERY 8 HOURS PRN
Qty: 30 TABLET | Refills: 0 | Status: SHIPPED | OUTPATIENT
Start: 2023-05-08

## 2023-05-08 RX ADMIN — ACETAMINOPHEN 1000 MG: 500 TABLET, FILM COATED ORAL at 10:26

## 2023-05-08 RX ADMIN — ACETAMINOPHEN 1000 MG: 500 TABLET, FILM COATED ORAL at 21:15

## 2023-05-08 RX ADMIN — IBUPROFEN 800 MG: 800 TABLET, FILM COATED ORAL at 04:43

## 2023-05-08 RX ADMIN — IBUPROFEN 800 MG: 800 TABLET, FILM COATED ORAL at 16:11

## 2023-05-08 RX ADMIN — ACETAMINOPHEN 1000 MG: 500 TABLET, FILM COATED ORAL at 00:02

## 2023-05-08 ASSESSMENT — PAIN DESCRIPTION - PAIN TYPE
TYPE: ACUTE PAIN

## 2023-05-08 ASSESSMENT — EDINBURGH POSTNATAL DEPRESSION SCALE (EPDS)
I HAVE BEEN SO UNHAPPY THAT I HAVE HAD DIFFICULTY SLEEPING: NOT AT ALL
THINGS HAVE BEEN GETTING ON TOP OF ME: NO, I HAVE BEEN COPING AS WELL AS EVER
I HAVE BEEN ABLE TO LAUGH AND SEE THE FUNNY SIDE OF THINGS: AS MUCH AS I ALWAYS COULD
THE THOUGHT OF HARMING MYSELF HAS OCCURRED TO ME: NEVER
I HAVE BEEN SO UNHAPPY THAT I HAVE BEEN CRYING: NO, NEVER
I HAVE BLAMED MYSELF UNNECESSARILY WHEN THINGS WENT WRONG: NO, NEVER
I HAVE BEEN ANXIOUS OR WORRIED FOR NO GOOD REASON: YES, SOMETIMES
I HAVE FELT SCARED OR PANICKY FOR NO GOOD REASON: NO, NOT AT ALL
I HAVE FELT SAD OR MISERABLE: NO, NOT AT ALL
I HAVE LOOKED FORWARD WITH ENJOYMENT TO THINGS: AS MUCH AS I EVER DID

## 2023-05-08 NOTE — DISCHARGE PLANNING
Discharge Planning Assessment Post Partum     Reason for Referral: History of anxiety and depression  Address: [facility] [place] [address]  Phone: [phone]  Type of Living Situation: living with FOB  Mom Diagnosis: Pregnancy  Baby Diagnosis: -38.3 weeks  Primary Language: English     Name of Baby: Salena Olvera (: 23)  Father of the Baby: Cindi Olvera   Involved in baby’s care? Yes  Contact Information: [phone]     Prenatal Care: Yes-Dr. Lomeli  Mom's PCP: APOLONIA Zuniga  PCP for new baby: Dr. Denton     Support System: FOB  Coping/Bonding between mother & baby: Yes  Source of Feeding: breast feeding  Supplies for Infant: prepared for infant; denies any needs     Mom's Insurance: [place]  Baby Covered on Insurance:Yes  Mother Employed/School: Student  Other children in the home/names & ages: 22 month old daughter (Analia)     Financial Hardship/Income: No   Mom's Mental status: alert and oriented  Services used prior to admit: Medicaid and WIC      CPS History: No  Psychiatric History: history of anxiety and depression.  Provided a list of counseling and support group resources specializing in maternal mental health.  MOB was prescribed Vistaril during pregnancy  Domestic Violence History: No  Drug/ETOH History: No     Resources Provided: diaper bank resources and post partum support and counseling resources provided  Referrals Made: diaper bank referral provided      Clearance for Discharge: Infant is cleared to discharge home with parents once medically cleared

## 2023-05-08 NOTE — CONSULTS
Initial Lactation Visit:    ADAM is an experienced breastfeeding mom who breast fed her first child for 14 months.  She stated she is breastfeeding her  without difficulty and denied pain and tissue damage to her breasts with latch.  Lactation assistance was offered, but AADM declined.    ADAM stated she has WIC, but was enrolled over the phone and is not sure what office she was signed up under.  She has not been contacted by a lactation peer counselor, so ADAM may be a client of [facility].  ADAM was informed of the outpatient lactation assistance available to her through [facility] post discharge.    Breastfeeding Plan:  Continue to offer infant the breast per feeding cues and within 3-4 hours from the last feed for a minimum of 8 or more feeds in a 24 hour period.  If infant is unable to latch onto the breast between now and when baby turns 24 hours old, MOB should be encouraged to hand express colostrum onto a spoon and feed back her expressed breast milk to infant immediately afterwards.     Lactation support remains available to MOB as needed and/or desired throughout hospital stay.

## 2023-05-08 NOTE — PROGRESS NOTES
1900 Report received from Cristiano MOMIN. Assumed care of pt, POC discussed. VSS. Pt sitting comfortably in bed holding infant.  1950: Pt taken up to the bathroom with a firm fundus and light lochia. Pt walking with a steady gate and able to successfully void.   2010: Pt taken up to postpartum in stable condition holding infant and with FOB by side. Infant bands checked x2. Report given to Adriana MOMIN. Care relinquished.

## 2023-05-08 NOTE — DISCHARGE PLANNING
Meds-to-Beds: Discharge prescription orders listed below delivered to patient's bedside. RN Cleo notified. Patient counseled.      Current Outpatient Medications   Medication Sig Dispense Refill    ibuprofen (MOTRIN) 800 MG Tab Take 1 Tablet by mouth every 8 hours as needed for Mild Pain. 30 Tablet 0      Radha Lopez, PharmD

## 2023-05-08 NOTE — CARE PLAN
Problem: Pain - Standard  Goal: Alleviation of pain or a reduction in pain to the patient’s comfort goal  Outcome: Progressing     Problem: Altered Physiologic Condition  Goal: Patient physiologically stable as evidenced by normal lochia, palpable uterine involution and vitals within normal limits  Outcome: Progressing     The patient is Stable - Low risk of patient condition declining or worsening    Shift Goals  Clinical Goals: pain control/ maintain a firm fundus with scant to light bleeding  Patient Goals:   Family Goals:     Progress made toward(s) clinical / shift goals:  Patient requests to call for pain medication when needed. Patient aware of available prn medication and available nonpharmacologic pain intervention. Patient able to care for self and infant at current pain level. Fundus firm. Lochia light. Patient educated to call if saturating a pad in more than hour or for large clots.      Patient is not progressing towards the following goals:

## 2023-05-08 NOTE — DISCHARGE SUMMARY
Discharge Summary:      Evonne Olvera    Admit Date:   2023  Discharge Date:  2023     Admitting diagnosis:  Labor and delivery, indication for care [O75.9]  Discharge Diagnosis: Status post vaginal, spontaneous.  Pregnancy Complications: anxiety  Tubal Ligation:  no        History:  Past Medical History:   Diagnosis Date    Anxiety     Depression     Head ache     Heart murmur     Hypertension     PIH    Migraine      OB History    Para Term  AB Living   2 2 2     2   SAB IAB Ectopic Molar Multiple Live Births           0 2      # Outcome Date GA Lbr Marc/2nd Weight Sex Delivery Anes PTL Lv   2 Term 23 38w3d 09:45 / 00:48 3.88 kg (8 lb 8.9 oz) F Vag-Spont EPI N KYLE   1 Term 21 38w0d  3.629 kg (8 lb) F Vag-Spont EPI N KYLE      Birth Comments: Pt states was induced due to preeclampsia        Patient has no known allergies.  Patient Active Problem List    Diagnosis Date Noted    Postpartum care following vaginal delivery 2023    Labor and delivery, indication for care 2023    Anxiety - therapy and Vistartil started 2023    Encounter for supervision of normal pregnancy in multigravida in second trimester 2022    Hx of preeclampsia, prior pregnancy, currently pregnant, second trimester 2022        Hospital Course:   27 y.o. , now para 2, was admitted with the above mentioned diagnosis, underwent Active Labor, vaginal, spontaneous. Patient postpartum course was unremarkable, with progressive advancement in diet , ambulation and toleration of oral analgesia. Patient without complaints today and desires discharge.      Vitals:    23 1906 23 0200 23 0623   BP: 130/61 121/84 103/65 115/79   Pulse: 89 86 71 86   Resp: 16 17 15 17   Temp: 37.3 °C (99.1 °F) 37 °C (98.6 °F) 36.3 °C (97.3 °F) 36.9 °C (98.4 °F)   TempSrc: Temporal Temporal Temporal Temporal   SpO2:  96% 93% 96%   Weight:       Height:            Current Facility-Administered Medications   Medication Dose    LR infusion      oxytocin (Pitocin) infusion (for post delivery)  125 mL/hr    oxytocin (Pitocin) infusion (for induction)  0.5-20 jonah-units/min    ropivacaine 0.2 % (NAROPIN) injection      lactated ringers infusion      docusate sodium (COLACE) capsule 100 mg  100 mg    ibuprofen (MOTRIN) tablet 800 mg  800 mg    acetaminophen (TYLENOL) tablet 1,000 mg  1,000 mg       Exam:  Breast Exam: negative  Abdomen: Abdomen soft, non-tender. BS normal. No masses,  No organomegaly  Fundus Non Tender: yes  Incision: none  Perineum: perineum intact  Extremity: extremities, peripheral pulses and reflexes normal     Labs:  Recent Labs     05/07/23  0808   WBC 11.1*   RBC 4.69   HEMOGLOBIN 15.1   HEMATOCRIT 44.9   MCV 95.7   MCH 32.2   MCHC 33.6   RDW 49.7   PLATELETCT 246   MPV 9.6        Activity:   Discharge to home  Pelvic Rest x 6 weeks    Assessment:  normal postpartum course  Discharge Assessment: No areas of skin breakdown/redness; surgical incision intact/healing. F/u Post delivery CBC prior to d/c.     [facility]: [facility] or [facility] in 5 weeks for vaginal ; 1 week for incision check.      Discharge Meds:   Current Outpatient Medications   Medication Sig Dispense Refill    ibuprofen (MOTRIN) 800 MG Tab Take 1 Tablet by mouth every 8 hours as needed for Mild Pain. 30 Tablet 0       Ralph Wong, P.A.

## 2023-05-09 VITALS
HEART RATE: 64 BPM | OXYGEN SATURATION: 97 % | WEIGHT: 183 LBS | HEIGHT: 62 IN | TEMPERATURE: 97.3 F | RESPIRATION RATE: 18 BRPM | DIASTOLIC BLOOD PRESSURE: 64 MMHG | SYSTOLIC BLOOD PRESSURE: 107 MMHG | BODY MASS INDEX: 33.68 KG/M2

## 2023-05-09 PROCEDURE — 700102 HCHG RX REV CODE 250 W/ 637 OVERRIDE(OP)

## 2023-05-09 PROCEDURE — A9270 NON-COVERED ITEM OR SERVICE: HCPCS

## 2023-05-09 RX ADMIN — ACETAMINOPHEN 1000 MG: 500 TABLET, FILM COATED ORAL at 04:01

## 2023-05-09 RX ADMIN — ACETAMINOPHEN 1000 MG: 500 TABLET, FILM COATED ORAL at 14:32

## 2023-05-09 RX ADMIN — IBUPROFEN 800 MG: 800 TABLET, FILM COATED ORAL at 08:58

## 2023-05-09 ASSESSMENT — PAIN DESCRIPTION - PAIN TYPE: TYPE: ACUTE PAIN

## 2023-05-09 NOTE — PROGRESS NOTES
2814- Report received from LILIANE [place].  Assumed care of patient.  Patient is sleeping at this time.  3810- Patient is speaking with the pediatrician at this time.  1026- Patient assessment done.  Patient reported she is voiding without difficulty and passing flatus.  Patient denied dizziness and reported she is walking without difficulty.  Discussed pain management plan, to include MD orders for prn pain medication.  Reviewed plan of care.  Patient verbalized understanding.  FOB at bedside.  5716- Dr. Cee notified that patient desires to stay due to her infant not being discharged.  Telephone order received to cancel discharge.

## 2023-05-09 NOTE — PROGRESS NOTES
1900 Assumed care of patient at change of shift.  Discussed plan of care with patient and answered all questions.

## 2023-05-09 NOTE — PROGRESS NOTES
1900 Assumed care of patient at change of shift.  Discussed plan of care with patient and answered all questions.      2015 Assessment completed.  Fundus firm, lochia light.  Patient will call if pain management intervention is needed.  Plan of care was reviewed and patient voiced understanding. Discussed skin to skin, feeding cues, and on demand feeds.

## 2023-05-09 NOTE — DISCHARGE SUMMARY
Discharge Summary:      Evonne Olvera    Admit Date:   2023  Discharge Date:  2023     Admitting diagnosis:  Labor and delivery, indication for care [O75.9]  Discharge Diagnosis: Status post vaginal, spontaneous.  Pregnancy Complications: anxiety  Tubal Ligation:  no        History:  Past Medical History:   Diagnosis Date    Anxiety     Depression     Head ache     Heart murmur     Hypertension     PIH    Migraine      OB History    Para Term  AB Living   2 2 2     2   SAB IAB Ectopic Molar Multiple Live Births           0 2      # Outcome Date GA Lbr Marc/2nd Weight Sex Delivery Anes PTL Lv   2 Term 23 38w3d 09:45 / 00:48 3.88 kg (8 lb 8.9 oz) F Vag-Spont EPI N KYLE   1 Term 21 38w0d  3.629 kg (8 lb) F Vag-Spont EPI N KYLE      Birth Comments: Pt states was induced due to preeclampsia        Patient has no known allergies.  Patient Active Problem List    Diagnosis Date Noted    Postpartum care following vaginal delivery 2023    Anxiety - therapy and Vistartil started 2023    Encounter for supervision of normal pregnancy in multigravida in second trimester 2022    Hx of preeclampsia, prior pregnancy, currently pregnant, second trimester 2022        Hospital Course:   27 y.o. , now para 2, was admitted with the above mentioned diagnosis, underwent Active Labor, vaginal, spontaneous. Patient postpartum course was unremarkable, with progressive advancement in diet , ambulation and toleration of oral analgesia. Patient without complaints today and desires discharge.      Vitals:    23 0200 23 0623 23 1738 23 0600   BP: 103/65 115/79 126/81 107/64   Pulse: 71 86 84 64   Resp: 15 17 16 18   Temp: 36.3 °C (97.3 °F) 36.9 °C (98.4 °F) 36.5 °C (97.7 °F) 36.3 °C (97.3 °F)   TempSrc: Temporal Temporal Temporal Temporal   SpO2: 93% 96% 97% 97%   Weight:       Height:           Current Facility-Administered Medications    Medication Dose    LR infusion      oxytocin (Pitocin) infusion (for post delivery)  125 mL/hr    lactated ringers infusion      docusate sodium (COLACE) capsule 100 mg  100 mg    ibuprofen (MOTRIN) tablet 800 mg  800 mg    acetaminophen (TYLENOL) tablet 1,000 mg  1,000 mg       Exam:  Breast Exam: negative  Abdomen: Abdomen soft, non-tender. BS normal. No masses,  No organomegaly  Fundus Non Tender: yes  Incision: none  Perineum: perineum intact  Extremity: extremities, peripheral pulses and reflexes normal     Labs:  Recent Labs     05/07/23  0808 05/08/23  0640   WBC 11.1* 12.8*   RBC 4.69 3.67*   HEMOGLOBIN 15.1 12.2   HEMATOCRIT 44.9 36.0*   MCV 95.7 98.1*   MCH 32.2 33.2*   MCHC 33.6 33.9   RDW 49.7 51.6*   PLATELETCT 246 190   MPV 9.6 9.9        Activity:   Discharge to home  Pelvic Rest x 6 weeks    Assessment:  Normal postpartum course  Discharge Assessment: No areas of skin breakdown/redness; surgical incision intact/healing. F/u Post delivery CBC prior to d/c.     [facility] up: [facility] or [facility] in 5 weeks for vaginal ; 1 week for incision check.      Discharge Meds:   Current Outpatient Medications   Medication Sig Dispense Refill    ibuprofen (MOTRIN) 800 MG Tab Take 1 Tablet by mouth every 8 hours as needed for Mild Pain. 30 Tablet 0       Abel Whittaker M.D.

## 2023-05-09 NOTE — CARE PLAN
The patient is Stable - Low risk of patient condition declining or worsening    Shift Goals  Clinical Goals: Maintain fundus firm, lochia light; pain management  Patient Goals: pain control, healthy mom and baby  Family Goals: provide emotional support    Progress made toward(s) clinical / shift goals:  Fundus firm, lochia scant; pt reported pain relief with use of prn tylenol and prn ibuprofen.

## 2023-05-09 NOTE — DISCHARGE INSTRUCTIONS

## 2023-05-09 NOTE — CARE PLAN
The patient is Stable - Low risk of patient condition declining or worsening    Shift Goals  Clinical Goals: Vital signs WNL, fundus firm, lochia WNL  Patient Goals: pain control, healthy mom and baby  Family Goals: provide emotional support    Progress made toward(s) clinical / shift goals:    Problem: Knowledge Deficit - Postpartum  Goal: Patient will verbalize and demonstrate understanding of self and infant care  Outcome: Progressing  Note: Patient verbalizes and demonstrates understanding of self care and care of the infant.       Problem: Psychosocial - Postpartum  Goal: Patient will verbalize and demonstrate effective bonding and parenting behavior  Outcome: Progressing  Note: Patient verbalizes and demonstrates effective bonding and parenting behavior.      Problem: Altered Physiologic Condition  Goal: Patient physiologically stable as evidenced by normal lochia, palpable uterine involution and vitals within normal limits  Outcome: Progressing  Note: Patient is physiologically stable as evidenced by normal lochia, firm fundus, and vitals WNL.        Patient is not progressing towards the following goals:

## 2023-05-09 NOTE — LACTATION NOTE
This note was copied from a baby's chart.  Lactation follow-up    Baby's weight loss 3.61%, couplet to be discharged today. Mother reports she breast fed 1st baby x 14 months and this baby is latching & breastfeeding well. Mother declines assist with latch at this time. Questions answered.     Feed baby with feeding cues and at least a minimum of 8x/24 hours.  Expect cluster feeding as this is normal during early days of life and growth spurts.  It is not recommended to let baby sleep longer than 4 hours between feedings and if sleepy, place skin to skin to promote feeding interest and milk production.  Baby's usually feed more frequently and longer when skin to skin with mother. It is not recommended to use pacifiers.    MOB reports she has [employer] insurance, NNB Resource sheet given.     Breastfeeding plan:  Breastfeed on cue a minimum 8 or more times in 24 hours no longer than 3-4 hours from last feed.

## 2023-05-11 NOTE — DISCHARGE SUMMARY
Discharge Summary:      Evonne Olvera    Admit Date:   2023  Discharge Date:  2023     Admitting diagnosis:  Labor and delivery, indication for care [O75.9]  Discharge Diagnosis: Status post vaginal, spontaneous.  Pregnancy Complications: anxiety  Tubal Ligation:  no        History:  Past Medical History:   Diagnosis Date    Anxiety     Depression     Head ache     Heart murmur     Hypertension     PIH    Migraine      OB History    Para Term  AB Living   2 2 2     2   SAB IAB Ectopic Molar Multiple Live Births           0 2      # Outcome Date GA Lbr Marc/2nd Weight Sex Delivery Anes PTL Lv   2 Term 23 38w3d 09:45 / 00:48 3.88 kg (8 lb 8.9 oz) F Vag-Spont EPI N KYLE   1 Term 21 38w0d  3.629 kg (8 lb) F Vag-Spont EPI N KYLE      Birth Comments: Pt states was induced due to preeclampsia        Patient has no known allergies.  Patient Active Problem List    Diagnosis Date Noted    Postpartum care following vaginal delivery 2023    Anxiety - therapy and Vistartil started 2023    Encounter for supervision of normal pregnancy in multigravida in second trimester 2022    Hx of preeclampsia, prior pregnancy, currently pregnant, second trimester 2022        Hospital Course:   27 y.o. , now para 2, was admitted with the above mentioned diagnosis, underwent Active Labor, vaginal, spontaneous. Patient postpartum course was unremarkable, with progressive advancement in diet , ambulation and toleration of oral analgesia. Patient without complaints today and desires discharge.      Vitals:    23 0200 23 0623 23 1738 23 0600   BP: 103/65 115/79 126/81 107/64   Pulse: 71 86 84 64   Resp: 15 17 16 18   Temp: 36.3 °C (97.3 °F) 36.9 °C (98.4 °F) 36.5 °C (97.7 °F) 36.3 °C (97.3 °F)   TempSrc: Temporal Temporal Temporal Temporal   SpO2: 93% 96% 97% 97%   Weight:       Height:           No current facility-administered medications for  this encounter.     Current Outpatient Medications   Medication    ibuprofen (MOTRIN) 800 MG Tab    hydrocortisone (ANUSOL-HC) 25 MG Suppos    hydrOXYzine pamoate (VISTARIL) 25 MG Cap    Prenatal MV-Min-Fe Fum-FA-DHA (PRENATAL 1 PO)       Exam:  Breast Exam: negative  Abdomen: Abdomen soft, non-tender. BS normal. No masses,  No organomegaly  Fundus Non Tender: yes  Incision: none  Perineum: perineum intact  Extremity: extremities, peripheral pulses and reflexes normal     Labs:           Activity:   Discharge to home  Pelvic Rest x 6 weeks    Assessment:  Normal postpartum course  Discharge Assessment: No areas of skin breakdown/redness; surgical incision intact/healing. F/u Post delivery CBC prior to d/c.     Follow up: [facility] or [facility] in 5 weeks for vaginal ; 1 week for incision check.      Discharge Meds:   Current Outpatient Medications   Medication Sig Dispense Refill    ibuprofen (MOTRIN) 800 MG Tab Take 1 Tablet by mouth every 8 hours as needed for Mild Pain. 30 Tablet 0    hydrocortisone (ANUSOL-HC) 25 MG Suppos Insert 1 Suppository into the rectum every 12 hours. 12 Suppository 2    hydrOXYzine pamoate (VISTARIL) 25 MG Cap Take 1 Capsule by mouth 3 times a day as needed for Anxiety. 90 Capsule 1    Prenatal MV-Min-Fe Fum-FA-DHA (PRENATAL 1 PO) Take  by mouth.         Abel Whittaker M.D.

## 2023-07-03 NOTE — PROGRESS NOTES
D/c instructions given, educated on PP d/c instructions for mom and baby. Pt understands and questions answered.   Scheduled 7/11/23 at 1140 with Dr. Pretty.   Encounter closed.

## 2024-11-23 ENCOUNTER — APPOINTMENT (OUTPATIENT)
Dept: URGENT CARE | Facility: CLINIC | Age: 29
End: 2024-11-23
Payer: COMMERCIAL

## 2024-11-23 ENCOUNTER — OFFICE VISIT (OUTPATIENT)
Dept: URGENT CARE | Facility: CLINIC | Age: 29
End: 2024-11-23
Payer: COMMERCIAL

## 2024-11-23 VITALS
WEIGHT: 114 LBS | OXYGEN SATURATION: 98 % | DIASTOLIC BLOOD PRESSURE: 66 MMHG | SYSTOLIC BLOOD PRESSURE: 124 MMHG | HEIGHT: 62 IN | BODY MASS INDEX: 20.98 KG/M2 | HEART RATE: 68 BPM | RESPIRATION RATE: 18 BRPM | TEMPERATURE: 98.5 F

## 2024-11-23 DIAGNOSIS — R05.1 ACUTE COUGH: ICD-10-CM

## 2024-11-23 LAB
FLUAV RNA SPEC QL NAA+PROBE: NEGATIVE
FLUBV RNA SPEC QL NAA+PROBE: NEGATIVE
RSV RNA SPEC QL NAA+PROBE: NEGATIVE
SARS-COV-2 RNA RESP QL NAA+PROBE: NEGATIVE

## 2024-11-23 PROCEDURE — 3078F DIAST BP <80 MM HG: CPT

## 2024-11-23 PROCEDURE — 99213 OFFICE O/P EST LOW 20 MIN: CPT

## 2024-11-23 PROCEDURE — 3074F SYST BP LT 130 MM HG: CPT

## 2024-11-23 PROCEDURE — 0241U POCT CEPHEID COV-2, FLU A/B, RSV - PCR: CPT

## 2024-11-23 ASSESSMENT — FIBROSIS 4 INDEX: FIB4 SCORE: 0.69

## 2024-11-23 NOTE — PROGRESS NOTES
Subjective:   Evonne Olvera is a 29 y.o. female who presents for Cough (Sx started 2 months ago, Cough and congestion )      HPI:    Patient presents to urgent care with concerns of cough and congestion for two months. Denies pmh of asthma, copd, smoking. Denies fever, chills, body aches. Cough is intermittently productive of phelgm. Tolerating solids and fluids. Endorses normal urinary output. Denies CP, SOB, dizziness, palpitations, leg swelling, orthopnea.    ROS As above in HPI    Medications:    Current Outpatient Medications on File Prior to Visit   Medication Sig Dispense Refill    ibuprofen (MOTRIN) 800 MG Tab Take 1 Tablet by mouth every 8 hours as needed for Mild Pain. (Patient not taking: Reported on 11/24/2024) 30 Tablet 0    hydrocortisone (ANUSOL-HC) 25 MG Suppos Insert 1 Suppository into the rectum every 12 hours. (Patient not taking: Reported on 11/24/2024) 12 Suppository 2    hydrOXYzine pamoate (VISTARIL) 25 MG Cap Take 1 Capsule by mouth 3 times a day as needed for Anxiety. (Patient not taking: Reported on 11/24/2024) 90 Capsule 1    Prenatal MV-Min-Fe Fum-FA-DHA (PRENATAL 1 PO) Take  by mouth. (Patient not taking: Reported on 11/24/2024)       No current facility-administered medications on file prior to visit.        Allergies:   Patient has no known allergies.    Problem List:   Patient Active Problem List   Diagnosis    Hx of preeclampsia, prior pregnancy, currently pregnant, second trimester    Encounter for supervision of normal pregnancy in multigravida in second trimester    Anxiety - therapy and Vistartil started 1/19/23    Postpartum care following vaginal delivery        Surgical History:  Past Surgical History:   Procedure Laterality Date    [id number] Left 2014    Pt states had screws put in    TONSILLECTOMY      age 11       Past Social Hx:   Social History     Tobacco Use    Smoking status: Former     Types: Cigarettes    Smokeless tobacco: Never   Vaping Use    Vaping status:  "Every Day    Substances: Nicotine, THC, CBD, Flavoring   Substance Use Topics    Alcohol use: Not Currently    Drug use: Not Currently     Types: Inhaled, Marijuana          Problem list, medications, and allergies reviewed by myself today in Epic.     Objective:     /66   Pulse 68   Temp 36.9 °C (98.5 °F) (Temporal)   Resp 18   Ht 1.575 m (5' 2\")   Wt 51.7 kg (114 lb)   SpO2 98%   BMI 20.85 kg/m²     Physical Exam  Vitals and nursing note reviewed.   Constitutional:       General: She is not in acute distress.     Appearance: Normal appearance. She is not ill-appearing or diaphoretic.   HENT:      Head: Normocephalic.      Right Ear: Tympanic membrane and ear canal normal.      Left Ear: Tympanic membrane and ear canal normal.      Nose: Nose normal.      Mouth/Throat:      Mouth: Mucous membranes are moist.      Pharynx: Oropharynx is clear. Uvula midline. Postnasal drip present. No pharyngeal swelling, oropharyngeal exudate or posterior oropharyngeal erythema.   Cardiovascular:      Rate and Rhythm: Normal rate and regular rhythm.      Heart sounds: Normal heart sounds. No murmur heard.     No friction rub. No gallop.   Pulmonary:      Effort: Pulmonary effort is normal. No respiratory distress.      Breath sounds: Normal breath sounds. No stridor. No wheezing, rhonchi or rales.   Chest:      Chest wall: No tenderness.   Abdominal:      General: Abdomen is flat. Bowel sounds are normal. There is no distension.      Palpations: Abdomen is soft. There is no mass.      Tenderness: There is no abdominal tenderness. There is no right CVA tenderness, left CVA tenderness, guarding or rebound.      Hernia: No hernia is present.   Musculoskeletal:      Cervical back: No rigidity or tenderness.   Lymphadenopathy:      Cervical: No cervical adenopathy.   Skin:     General: Skin is warm and dry.      Capillary Refill: Capillary refill takes less than 2 seconds.      Findings: No rash.   Neurological:      Mental " Status: She is alert and oriented to person, place, and time.         Assessment/Plan:       Diagnosis and associated orders:   1. Acute cough  - POCT CoV-2, Flu A/B, RSV by PCR        Comments/MDM:     Patient tested negative for covid, influenza, rsv  Vital signs are stable, patient is nontoxic. No signs of respiratory distress.  Suspect post nasal drip. Lungs are cta in all lobes bilaterally.   Supportive measures encouraged: Rest, increased oral hydration, NSAIDs/tylenol as needed per package instructions, decongestant, warm humidification, otc antihistamines, Flonase, cough suppressant as needed   Albuterol ordered for symptomatic relief and in case she develops shortness of breath.  Strict return to ER precautions reviewed  Follow-up with primary care advised       Return to clinic or go to ED if symptoms worsen or persist. Indications for ED discussed at length. Patient/Parent/Guardian voices understanding. Follow-up with your primary care provider in 3-5 days. Red flag symptoms discussed. All side effects of medication discussed including allergic response, GI upset, tendon injury, rash, sedation etc.    Please note that this dictation was created using voice recognition software. I have made a reasonable attempt to correct obvious errors, but I expect that there are errors of grammar and possibly content that I did not discover before finalizing the note.    This note was electronically signed by ANI Ramey

## 2024-11-23 NOTE — PROGRESS NOTES
Subjective:   Evonne Olvera is a 29 y.o. female who presents for Cough (Sx started 2 months ago, Cough and congestion )      HPI:        ROS As above in HPI    Medications:    Current Outpatient Medications on File Prior to Visit   Medication Sig Dispense Refill    ibuprofen (MOTRIN) 800 MG Tab Take 1 Tablet by mouth every 8 hours as needed for Mild Pain. (Patient not taking: Reported on 11/23/2024) 30 Tablet 0    hydrocortisone (ANUSOL-HC) 25 MG Suppos Insert 1 Suppository into the rectum every 12 hours. (Patient not taking: Reported on 11/23/2024) 12 Suppository 2    hydrOXYzine pamoate (VISTARIL) 25 MG Cap Take 1 Capsule by mouth 3 times a day as needed for Anxiety. (Patient not taking: Reported on 11/23/2024) 90 Capsule 1    Prenatal MV-Min-Fe Fum-FA-DHA (PRENATAL 1 PO) Take  by mouth. (Patient not taking: Reported on 11/23/2024)       No current facility-administered medications on file prior to visit.        Allergies:   Patient has no known allergies.    Problem List:   Patient Active Problem List   Diagnosis    Hx of preeclampsia, prior pregnancy, currently pregnant, second trimester    Encounter for supervision of normal pregnancy in multigravida in second trimester    Anxiety - therapy and Vistartil started 1/19/23    Postpartum care following vaginal delivery        Surgical History:  Past Surgical History:   Procedure Laterality Date    [id number] Left 2014    Pt states had screws put in    TONSILLECTOMY      age 11       Past Social Hx:   Social History     Tobacco Use    Smoking status: Former     Types: Cigarettes    Smokeless tobacco: Never   Vaping Use    Vaping status: Every Day    Substances: Nicotine, THC, CBD, Flavoring   Substance Use Topics    Alcohol use: Not Currently    Drug use: Yes     Types: Inhaled, Marijuana          Problem list, medications, and allergies reviewed by myself today in Epic.     Objective:     /66   Pulse 68   Temp 36.9 °C (98.5 °F) (Temporal)   Resp 18    "Ht 1.575 m (5' 2\")   Wt 51.7 kg (114 lb)   SpO2 98%   BMI 20.85 kg/m²     Physical Exam    Assessment/Plan:       Results for orders placed or performed during the hospital encounter of 05/07/23   Hold Blood Bank Specimen (Not Tested)    Collection Time: 05/07/23  8:08 AM   Result Value Ref Range    Holding Tube - Bb DONE    CBC with differential    Collection Time: 05/07/23  8:08 AM   Result Value Ref Range    WBC 11.1 (H) 4.8 - 10.8 K/uL    RBC 4.69 4.20 - 5.40 M/uL    Hemoglobin 15.1 12.0 - 16.0 g/dL    Hematocrit 44.9 37.0 - 47.0 %    MCV 95.7 81.4 - 97.8 fL    MCH 32.2 27.0 - 33.0 pg    MCHC 33.6 33.6 - 35.0 g/dL    RDW 49.7 35.9 - 50.0 fL    Platelet Count 246 164 - 446 K/uL    MPV 9.6 9.0 - 12.9 fL    Neutrophils-Polys 77.10 (H) 44.00 - 72.00 %    Lymphocytes 15.90 (L) 22.00 - 41.00 %    Monocytes 5.20 0.00 - 13.40 %    Eosinophils 0.90 0.00 - 6.90 %    Basophils 0.20 0.00 - 1.80 %    Immature Granulocytes 0.70 0.00 - 0.90 %    Nucleated RBC 0.00 /100 WBC    Neutrophils (Absolute) 8.51 (H) 2.00 - 7.15 K/uL    Lymphs (Absolute) 1.76 1.00 - 4.80 K/uL    Monos (Absolute) 0.58 0.00 - 0.85 K/uL    Eos (Absolute) 0.10 0.00 - 0.51 K/uL    Baso (Absolute) 0.02 0.00 - 0.12 K/uL    Immature Granulocytes (abs) 0.08 0.00 - 0.11 K/uL    NRBC (Absolute) 0.00 K/uL   T.PALLIDUM AB STERLING (Syphilis)    Collection Time: 05/07/23  8:08 AM   Result Value Ref Range    Syphilis, Treponemal Qual Non-Reactive Non-Reactive   Comp Metabolic Panel    Collection Time: 05/07/23  8:08 AM   Result Value Ref Range    Sodium 142 135 - 145 mmol/L    Potassium 4.0 3.6 - 5.5 mmol/L    Chloride 108 96 - 112 mmol/L    Co2 17 (L) 20 - 33 mmol/L    Anion Gap 17.0 (H) 7.0 - 16.0    Glucose 87 65 - 99 mg/dL    Bun 9 8 - 22 mg/dL    Creatinine 0.59 0.50 - 1.40 mg/dL    Calcium 8.7 8.5 - 10.5 mg/dL    AST(SGOT) 17 12 - 45 U/L    ALT(SGPT) 13 2 - 50 U/L    Alkaline Phosphatase 171 (H) 30 - 99 U/L    Total Bilirubin <0.2 0.1 - 1.5 mg/dL    Albumin 3.7 " 3.2 - 4.9 g/dL    Total Protein 6.3 6.0 - 8.2 g/dL    Globulin 2.6 1.9 - 3.5 g/dL    A-G Ratio 1.4 g/dL   CORRECTED CALCIUM    Collection Time: 05/07/23  8:08 AM   Result Value Ref Range    Correct Calcium 8.9 8.5 - 10.5 mg/dL   ESTIMATED GFR    Collection Time: 05/07/23  8:08 AM   Result Value Ref Range    GFR (CKD-EPI) 127 >60 mL/min/1.73 m 2   PROTEIN/CREAT RATIO URINE    Collection Time: 05/07/23  9:15 AM   Result Value Ref Range    Total Protein, Urine 6.0 0.0 - 15.0 mg/dL    Creatinine, Random Urine 47.14 mg/dL    Protein Creatinine Ratio 127 (H) 10 - 107 mg/g   CBC without differential- Once in 8 hours post delivery    Collection Time: 05/08/23  6:40 AM   Result Value Ref Range    WBC 12.8 (H) 4.8 - 10.8 K/uL    RBC 3.67 (L) 4.20 - 5.40 M/uL    Hemoglobin 12.2 12.0 - 16.0 g/dL    Hematocrit 36.0 (L) 37.0 - 47.0 %    MCV 98.1 (H) 81.4 - 97.8 fL    MCH 33.2 (H) 27.0 - 33.0 pg    MCHC 33.9 33.6 - 35.0 g/dL    RDW 51.6 (H) 35.9 - 50.0 fL    Platelet Count 190 164 - 446 K/uL    MPV 9.9 9.0 - 12.9 fL       Diagnosis and associated orders:   There are no diagnoses linked to this encounter.     Comments/MDM:         ***       Return to clinic or go to ED if symptoms worsen or persist. Indications for ED discussed at length. Patient/Parent/Guardian voices understanding. Follow-up with your primary care provider in 3-5 days. Red flag symptoms discussed. All side effects of medication discussed including allergic response, GI upset, tendon injury, rash, sedation etc.    Please note that this dictation was created using voice recognition software. I have made a reasonable attempt to correct obvious errors, but I expect that there are errors of grammar and possibly content that I did not discover before finalizing the note.    This note was electronically signed by Iris Chen APRN

## 2024-11-24 ENCOUNTER — OFFICE VISIT (OUTPATIENT)
Dept: URGENT CARE | Facility: PHYSICIAN GROUP | Age: 29
End: 2024-11-24
Payer: COMMERCIAL

## 2024-11-24 VITALS
HEART RATE: 100 BPM | HEIGHT: 62 IN | RESPIRATION RATE: 18 BRPM | TEMPERATURE: 98.3 F | DIASTOLIC BLOOD PRESSURE: 62 MMHG | OXYGEN SATURATION: 98 % | BODY MASS INDEX: 21.35 KG/M2 | SYSTOLIC BLOOD PRESSURE: 118 MMHG | WEIGHT: 116 LBS

## 2024-11-24 DIAGNOSIS — J40 BRONCHITIS: ICD-10-CM

## 2024-11-24 DIAGNOSIS — R05.8 POST-VIRAL COUGH SYNDROME: ICD-10-CM

## 2024-11-24 PROCEDURE — 3074F SYST BP LT 130 MM HG: CPT

## 2024-11-24 PROCEDURE — 99213 OFFICE O/P EST LOW 20 MIN: CPT

## 2024-11-24 PROCEDURE — 3078F DIAST BP <80 MM HG: CPT

## 2024-11-24 RX ORDER — METHYLPREDNISOLONE 4 MG/1
TABLET ORAL
Qty: 21 TABLET | Refills: 0 | Status: SHIPPED | OUTPATIENT
Start: 2024-11-24

## 2024-11-24 ASSESSMENT — ENCOUNTER SYMPTOMS
FEVER: 0
RHINORRHEA: 1
MYALGIAS: 0
VOMITING: 0
HEMOPTYSIS: 0
NAUSEA: 0
SORE THROAT: 0
HEADACHES: 0
EYE DISCHARGE: 0
BACK PAIN: 0
COUGH: 1
SHORTNESS OF BREATH: 0
CHILLS: 0
WHEEZING: 0
HEARTBURN: 0
SWEATS: 0
WEIGHT LOSS: 0
SPUTUM PRODUCTION: 0

## 2024-11-24 ASSESSMENT — FIBROSIS 4 INDEX: FIB4 SCORE: 0.69

## 2024-11-24 ASSESSMENT — COPD QUESTIONNAIRES: COPD: 0

## 2024-11-24 NOTE — LETTER
November 24, 2024    To Whom It May Concern:         This is confirmation that Evonne Brown Rather attended her scheduled appointment with MOHINDER Molina on 11/24/24.         If you have any questions please do not hesitate to call me at the phone number listed below.    Sincerely,          PRITESH Molina.  [phone]

## 2024-11-24 NOTE — PROGRESS NOTES
"Subjective:   Evonne Olvera is a 29 y.o. female who presents for Cough (X 5 wk cough, congestion after having covid)      Patient presents companied by her 2 younger daughters all of which are complaining of cough and congestion for the past 5 weeks.  Patient states the whole family had tested positive for COVID 5 weeks prior to today's visit.  States that primary symptoms were 1 day of \"feeling horrible\" followed by few weeks of congestion that had never quite gone away.  Patient states that she has personally had a dry cough that is worse at night.  She denies any fever, chills, body aches and no production of mucus to her cough.  She denies any shortness of breath chest pain or dyspnea.  No stridor sore throat or tongue swelling.  Of note patient was seen yesterday at [facility] urgent care for similar symptoms, viral swab was done and was negative    Cough  This is a new problem. The current episode started more than 1 month ago. The problem has been unchanged. The cough is Non-productive. Associated symptoms include nasal congestion, postnasal drip and rhinorrhea. Pertinent negatives include no chest pain, chills, ear congestion, ear pain, fever, headaches, heartburn, hemoptysis, myalgias, rash, sore throat, shortness of breath, sweats, weight loss or wheezing. Nothing aggravates the symptoms. She has tried OTC cough suppressant for the symptoms. The treatment provided no relief. There is no history of asthma, bronchiectasis, bronchitis, COPD, emphysema, environmental allergies or pneumonia.       Review of Systems   Constitutional:  Negative for chills, fever and weight loss.   HENT:  Positive for postnasal drip and rhinorrhea. Negative for congestion, ear discharge, ear pain and sore throat.    Eyes:  Negative for discharge.   Respiratory:  Positive for cough. Negative for hemoptysis, sputum production, shortness of breath and wheezing.    Cardiovascular:  Negative for chest pain.   Gastrointestinal:  " Negative for heartburn, nausea and vomiting.   Genitourinary: Negative.    Musculoskeletal:  Negative for back pain and myalgias.   Skin:  Negative for rash.   Neurological:  Negative for headaches.   Endo/Heme/Allergies:  Negative for environmental allergies.     Refer to HPI for additional details.    During this visit, appropriate PPE was worn, and hand hygiene was performed.    PMH:  has a past medical history of Anxiety, Depression, Head ache, Heart murmur, Hypertension, and Migraine.    MEDS:   Current Outpatient Medications:     methylPREDNISolone (MEDROL DOSEPAK) 4 MG Tablet Therapy Pack, Follow schedule on package instructions., Disp: 21 Tablet, Rfl: 0    ibuprofen (MOTRIN) 800 MG Tab, Take 1 Tablet by mouth every 8 hours as needed for Mild Pain. (Patient not taking: Reported on 11/24/2024), Disp: 30 Tablet, Rfl: 0    hydrocortisone (ANUSOL-HC) 25 MG Suppos, Insert 1 Suppository into the rectum every 12 hours. (Patient not taking: Reported on 11/24/2024), Disp: 12 Suppository, Rfl: 2    hydrOXYzine pamoate (VISTARIL) 25 MG Cap, Take 1 Capsule by mouth 3 times a day as needed for Anxiety. (Patient not taking: Reported on 11/24/2024), Disp: 90 Capsule, Rfl: 1    Prenatal MV-Min-Fe Fum-FA-DHA (PRENATAL 1 PO), Take  by mouth. (Patient not taking: Reported on 11/24/2024), Disp: , Rfl:     ALLERGIES: No Known Allergies  SURGHX:   Past Surgical History:   Procedure Laterality Date    [id number] Left 2014    Pt states had screws put in    TONSILLECTOMY      age 11     SOCHX:  reports that she has quit smoking. Her smoking use included cigarettes. She has never used smokeless tobacco. She reports that she does not currently use alcohol. She reports that she does not currently use drugs after having used the following drugs: Inhaled and Marijuana.    FH: Per HPI as applicable/pertinent.    Medications, Allergies, and current problem list reviewed today in Epic.     Objective:     /62   Pulse 100   Temp 36.8 °C  "(98.3 °F)   Resp 18   Ht 1.575 m (5' 2\")   Wt 52.6 kg (116 lb)   SpO2 98%     Physical Exam  Vitals reviewed.   Constitutional:       General: She is not in acute distress.     Appearance: She is normal weight. She is not ill-appearing.   HENT:      Head: Normocephalic and atraumatic.      Right Ear: Tympanic membrane, ear canal and external ear normal.      Left Ear: Tympanic membrane, ear canal and external ear normal.      Nose: Congestion and rhinorrhea present.      Mouth/Throat:      Mouth: Mucous membranes are moist.      Pharynx: Posterior oropharyngeal erythema present. No oropharyngeal exudate.   Eyes:      General:         Right eye: No discharge.         Left eye: No discharge.      Pupils: Pupils are equal, round, and reactive to light.   Cardiovascular:      Rate and Rhythm: Normal rate.   Pulmonary:      Effort: Pulmonary effort is normal. No respiratory distress.      Breath sounds: No stridor. No wheezing, rhonchi or rales.   Chest:      Chest wall: No tenderness.   Abdominal:      General: Abdomen is flat.      Tenderness: There is no abdominal tenderness. There is no guarding.   Musculoskeletal:      Cervical back: Normal range of motion. No rigidity or tenderness.   Lymphadenopathy:      Cervical: No cervical adenopathy.   Neurological:      Mental Status: She is alert.         Assessment/Plan:     Diagnosis and associated orders:     1. Post-viral cough syndrome  - methylPREDNISolone (MEDROL DOSEPAK) 4 MG Tablet Therapy Pack; Follow schedule on package instructions.  Dispense: 21 Tablet; Refill: 0    2. Bronchitis  - methylPREDNISolone (MEDROL DOSEPAK) 4 MG Tablet Therapy Pack; Follow schedule on package instructions.  Dispense: 21 Tablet; Refill: 0     Comments/MDM:     Patient history and physical exam are consistent with post viral syndrome with concomitant nonproductive cough and bronchitis.  Physical exam was unremarkable patient's lung fields are clear to auscultation with no " adventitious breath sounds.  I do think that this is likely residual inflammation irritation causing patient have cyclic cough.  Will focus management on reducing inflammation and suppressing cough  Outpatient management will consist of Medrol Dosepak, Tylenol/ibuprofen at a staggered stack schedule, copious fluids and electrolytes, cough deep breathe once per hour  Follow up in 3-5 days if no improvement in symptoms           Differential diagnosis, natural history, supportive care, and indications for immediate follow-up discussed.    Advised the patient to follow-up with the primary care physician for recheck, reevaluation, and consideration of further management.    Please note that this dictation was created using voice recognition software. I have made a reasonable attempt to correct obvious errors, but I expect that there are errors of grammar and possibly content that I did not discover before finalizing the note.    This note was electronically signed by MOHINDER Molina

## 2025-02-18 ENCOUNTER — OFFICE VISIT (OUTPATIENT)
Dept: URGENT CARE | Facility: PHYSICIAN GROUP | Age: 30
End: 2025-02-18
Payer: COMMERCIAL

## 2025-02-18 ENCOUNTER — APPOINTMENT (OUTPATIENT)
Dept: URGENT CARE | Facility: PHYSICIAN GROUP | Age: 30
End: 2025-02-18
Payer: COMMERCIAL

## 2025-02-18 VITALS
DIASTOLIC BLOOD PRESSURE: 72 MMHG | HEART RATE: 75 BPM | OXYGEN SATURATION: 99 % | SYSTOLIC BLOOD PRESSURE: 128 MMHG | WEIGHT: 115 LBS | BODY MASS INDEX: 21.16 KG/M2 | RESPIRATION RATE: 16 BRPM | TEMPERATURE: 97.2 F | HEIGHT: 62 IN

## 2025-02-18 DIAGNOSIS — H60.501 ACUTE OTITIS EXTERNA OF RIGHT EAR, UNSPECIFIED TYPE: ICD-10-CM

## 2025-02-18 PROCEDURE — 3074F SYST BP LT 130 MM HG: CPT | Performed by: STUDENT IN AN ORGANIZED HEALTH CARE EDUCATION/TRAINING PROGRAM

## 2025-02-18 PROCEDURE — 99213 OFFICE O/P EST LOW 20 MIN: CPT | Performed by: STUDENT IN AN ORGANIZED HEALTH CARE EDUCATION/TRAINING PROGRAM

## 2025-02-18 PROCEDURE — 3078F DIAST BP <80 MM HG: CPT | Performed by: STUDENT IN AN ORGANIZED HEALTH CARE EDUCATION/TRAINING PROGRAM

## 2025-02-18 RX ORDER — CIPROFLOXACIN AND DEXAMETHASONE 3; 1 MG/ML; MG/ML
4 SUSPENSION/ DROPS AURICULAR (OTIC) 2 TIMES DAILY
Qty: 7.5 ML | Refills: 0 | Status: SHIPPED | OUTPATIENT
Start: 2025-02-18

## 2025-02-18 ASSESSMENT — ENCOUNTER SYMPTOMS
FEVER: 0
CHILLS: 0

## 2025-02-18 ASSESSMENT — FIBROSIS 4 INDEX: FIB4 SCORE: 0.69

## 2025-02-19 NOTE — PROGRESS NOTES
"Subjective:   Evonne Olvera is a 29 y.o. female who presents for Otalgia (Bilateral ear pain x 10 days, L ear improving gradually.)      HPI:  -year-old female presents with bilateral ear pain for the past 10 days left ear has been improving but gradually right ear still present.  Denies any fevers or chills has mild congestion and but no significant cough or other upper respiratory illness.  Reports feeling of achy on the right side as well as concerning no changes in hearing does not feel like pressure.  Mild pressure with palpation of the ear movement of the ear.    Review of Systems   Constitutional:  Negative for chills and fever.   HENT:  Positive for congestion and ear pain.        Medications:    hydrocortisone Supp  hydrOXYzine pamoate Caps  ibuprofen Tabs  methylPREDNISolone Tbpk  PRENATAL 1 PO    Allergies: Patient has no known allergies.    Problem List: Evonne Olvera does not have any pertinent problems on file.    Surgical History:  Past Surgical History:   Procedure Laterality Date    [id number] Left 2014    Pt states had screws put in    TONSILLECTOMY      age 11       Past Social Hx: Evonne Olvera  reports that she has quit smoking. Her smoking use included cigarettes. She has never used smokeless tobacco. She reports that she does not currently use alcohol. She reports current drug use. Drugs: Inhaled and Marijuana.     Past Family Hx:  Evonne Olvera family history includes Depression in her brother, father, mother, and sister; Diabetes in her paternal grandfather; Multiple Sclerosis in her paternal grandfather.     Problem list, medications, and allergies reviewed by myself today in Epic.     Objective:     /72 (BP Location: Left arm, Patient Position: Sitting, BP Cuff Size: Adult long)   Pulse 75   Temp 36.2 °C (97.2 °F) (Temporal)   Resp 16   Ht 1.575 m (5' 2\")   Wt 52.2 kg (115 lb)   SpO2 99%   BMI 21.03 kg/m²     Physical Exam  Constitutional:       " Appearance: Normal appearance.   HENT:      Head: Normocephalic and atraumatic.      Right Ear: Tympanic membrane normal. Tenderness present.      Left Ear: Tympanic membrane normal.      Ears:      Comments: On the posterior external canal small amounts of erythema present.  Increased cerumen as compared to left     Nose: Nose normal. No congestion or rhinorrhea.      Mouth/Throat:      Mouth: Mucous membranes are moist.      Pharynx: Oropharynx is clear.   Eyes:      Extraocular Movements: Extraocular movements intact.      Conjunctiva/sclera: Conjunctivae normal.   Cardiovascular:      Rate and Rhythm: Normal rate and regular rhythm.      Pulses: Normal pulses.      Heart sounds: Normal heart sounds.   Pulmonary:      Effort: Pulmonary effort is normal.      Breath sounds: Normal breath sounds.   Neurological:      Mental Status: She is alert.         Assessment/Plan:     Diagnosis and associated orders:     1. Acute otitis externa of right ear, unspecified type  ciprofloxacin/dexamethasone (CIPRODEX) 0.3-0.1 % Suspension         Comments/MDM:     1. Acute otitis externa of right ear, unspecified type  Possible otitis externa of the right ear.  Recommend Ciprodex 3 to 4 drops twice daily into the ear for 5 days.  - We discussed the increased pressure in bilateral arthroplasty secondary to congestion and eustachian tube disorder.  Recommend Flonase and once daily antihistamine to assist with this.  She has been gradually improving and there is no concrete evidence of otitis media.    Patient strict on return precautions including changes in hearing worsening pain return or fever and chills.    - ciprofloxacin/dexamethasone (CIPRODEX) 0.3-0.1 % Suspension; Administer 4 Drops into the right ear 2 times a day.  Dispense: 7.5 mL; Refill: 0           Differential diagnosis, natural history, supportive care, and indications for immediate follow-up discussed.    Advised the patient to follow-up with the primary care  physician for recheck, reevaluation, and consideration of further management.    Please note that this dictation was created using voice recognition software. I have made a reasonable attempt to correct obvious errors, but I expect that there are errors of grammar and possibly content that I did not discover before finalizing the note.    Adolfo Quinn M.D.

## 2025-05-21 ENCOUNTER — OFFICE VISIT (OUTPATIENT)
Dept: URGENT CARE | Facility: PHYSICIAN GROUP | Age: 30
End: 2025-05-21
Payer: COMMERCIAL

## 2025-05-21 VITALS
RESPIRATION RATE: 16 BRPM | SYSTOLIC BLOOD PRESSURE: 126 MMHG | BODY MASS INDEX: 20.33 KG/M2 | WEIGHT: 110.45 LBS | HEIGHT: 62 IN | OXYGEN SATURATION: 98 % | HEART RATE: 78 BPM | TEMPERATURE: 98.7 F | DIASTOLIC BLOOD PRESSURE: 80 MMHG

## 2025-05-21 DIAGNOSIS — T14.8XXA PUNCTURE WOUND: Primary | ICD-10-CM

## 2025-05-21 PROCEDURE — 3074F SYST BP LT 130 MM HG: CPT | Performed by: PHYSICIAN ASSISTANT

## 2025-05-21 PROCEDURE — 3079F DIAST BP 80-89 MM HG: CPT | Performed by: PHYSICIAN ASSISTANT

## 2025-05-21 PROCEDURE — 99213 OFFICE O/P EST LOW 20 MIN: CPT | Performed by: PHYSICIAN ASSISTANT

## 2025-05-22 NOTE — PROGRESS NOTES
"Subjective:   Evonne Olvera is a 29 y.o. female who presents for Other (X today stepped on a rusted \"fork\" with right foot. )      HPI  The patient presents to the Urgent Care with complaints of a puncture wound to her right heel onset prior to arrival.  She was in the garden and accidentally stepped on a rusted fork.  She did clean the area.  Bleeding controlled.  She was unsure if she is up-to-date on her tetanus vaccine.  Slight pain with walking.        Past Medical History[1]  Allergies[2]     Objective:     /80   Pulse 78   Temp 37.1 °C (98.7 °F) (Temporal)   Resp 16   Ht 1.575 m (5' 2\")   Wt 50.1 kg (110 lb 7.2 oz)   SpO2 98%   BMI 20.20 kg/m²     Physical Exam  Vitals reviewed.   Constitutional:       General: She is not in acute distress.     Appearance: Normal appearance. She is not ill-appearing or toxic-appearing.   Eyes:      Conjunctiva/sclera: Conjunctivae normal.   Cardiovascular:      Rate and Rhythm: Normal rate.   Pulmonary:      Effort: Pulmonary effort is normal.   Musculoskeletal:      Cervical back: Neck supple. No rigidity.        Feet:       Comments: Small superficial wound to the plantar surface of the right heel.  No active bleeding.  Some scabbing.  No obvious foreign body.  No significant swelling.  Minimally tender.   Skin:     General: Skin is warm and dry.   Neurological:      General: No focal deficit present.      Mental Status: She is alert and oriented to person, place, and time.   Psychiatric:         Mood and Affect: Mood normal.         Behavior: Behavior normal.         Diagnosis and associated orders:     1. Puncture wound       Comments/MDM:     Tetanus is up to date.   Wound was irrigated with normal saline and dressed with a Band-Aid.  Wound care discussed.  Watch for signs of infection as discussed.  Ibuprofen for any pain.       I personally reviewed prior external notes and test results pertinent to today's visit. Pathogenesis of diagnosis discussed " including typical length and natural progression. Supportive care, natural history, differential diagnoses, and indications for immediate follow-up discussed. Patient expresses understanding and agrees to plan. Patient denies any other questions or concerns.     Follow-up with the primary care physician for recheck, reevaluation, and consideration of further management.    Please note that this dictation was created using voice recognition software. I have made a reasonable attempt to correct obvious errors, but I expect that there are errors of grammar and possibly content that I did not discover before finalizing the note.    This note was electronically signed by Marcial Klein PA-C         [1]   Past Medical History:  Diagnosis Date    Anxiety     Depression     Head ache     Heart murmur     Hypertension     PIH    Migraine    [2] No Known Allergies